# Patient Record
Sex: FEMALE | Race: WHITE | Employment: FULL TIME | URBAN - METROPOLITAN AREA
[De-identification: names, ages, dates, MRNs, and addresses within clinical notes are randomized per-mention and may not be internally consistent; named-entity substitution may affect disease eponyms.]

---

## 2017-04-10 ENCOUNTER — OFFICE VISIT (OUTPATIENT)
Dept: SURGERY | Facility: CLINIC | Age: 42
End: 2017-04-10
Payer: COMMERCIAL

## 2017-04-10 VITALS
BODY MASS INDEX: 19.99 KG/M2 | SYSTOLIC BLOOD PRESSURE: 137 MMHG | HEART RATE: 62 BPM | DIASTOLIC BLOOD PRESSURE: 86 MMHG | HEIGHT: 65 IN | WEIGHT: 120 LBS

## 2017-04-10 DIAGNOSIS — N60.19 FIBROCYSTIC BREAST CHANGES, UNSPECIFIED LATERALITY: Primary | ICD-10-CM

## 2017-04-10 PROCEDURE — 99213 OFFICE O/P EST LOW 20 MIN: CPT | Performed by: SURGERY

## 2017-04-10 NOTE — MR AVS SNAPSHOT
"              After Visit Summary   4/10/2017    Trini LOYA [facility]    MRN: [id number]           Patient Information     Date Of Birth          1975        Visit Information        Provider Department      4/10/2017 8:20 AM Kat Echevarria MD [facility]       Follow-ups after your visit        Who to contact     If you have questions or need follow up information about today's clinic visit or your schedule please contact [facility] directly at [phone].  Normal or non-critical lab and imaging results will be communicated to you by [employer]nethart, letter or phone within 4 business days after the clinic has received the results. If you do not hear from us within 7 days, please contact the clinic through [employer]t or phone. If you have a critical or abnormal lab result, we will notify you by phone as soon as possible.  Submit refill requests through SET or call your pharmacy and they will forward the refill request to us. Please allow 3 business days for your refill to be completed.          Additional Information About Your Visit        MyChart Information     SET gives you secure access to your electronic health record. If you see a primary care provider, you can also send messages to your care team and make appointments. If you have questions, please call your primary care clinic.  If you do not have a primary care provider, please call [phone] and they will assist you.        Care EveryWhere ID     This is your Care EveryWhere ID. This could be used by other organizations to access your [place] medical records  [id number]        Your Vitals Were     Pulse Height BMI (Body Mass Index)             62 5' 5\" (1.651 m) 19.97 kg/m2          Blood Pressure from Last 3 Encounters:   04/10/17 137/86   10/20/16 112/74   08/18/16 104/70    Weight from Last 3 Encounters:   04/10/17 120 lb (54.4 kg) "   08/18/16 122 lb 8 oz (55.6 kg)   03/28/16 120 lb (54.4 kg)              Today, you had the following     No orders found for display       Primary Care Provider Office Phone # Fax #    Christine Dyer [phone]       [facility] - [place] [address]  [facility] [address]        Thank you!     Thank you for choosing [facility]  for your care. Our goal is always to provide you with excellent care. Hearing back from our patients is one way we can continue to improve our services. Please take a few minutes to complete the written survey that you may receive in the mail after your visit with us. Thank you!             Your Updated Medication List - Protect others around you: Learn how to safely use, store and throw away your medicines at www.disposemymeds.org.          This list is accurate as of: 4/10/17  8:48 AM.  Always use your most recent med list.                   Brand Name Dispense Instructions for use    ALIGN 4 MG Caps      Take 1 tablet by mouth daily.       ketoconazole 2 % shampoo    NIZORAL    120 mL    Apply to the affected area and wash off after 5 minutes.       SUMAtriptan 25 MG tablet    IMITREX    9 tablet    Take 1-2 tablets (25-50 mg) by mouth at onset of headache for migraine May repeat dose in 2 hours.  Do not exceed 200 mg in 24 hours       VITAMIN B 12 PO          VITAMIN D (CHOLECALCIFEROL) PO      Take 1,000 Units by mouth daily

## 2017-04-10 NOTE — LETTER
"  April 10, 2017      [facility]  [birth year]    HISTORY OF PRESENT ILLNESS: Trini Schumacher is a 41 year old female who is seen in follow up regarding fibrocystic change.  I have seen Tirni several time previously for breast lumps that have been cysts. I last saw her in 2016. She has not noted new changes but does feel many cysts in both breasts. Her breasts are not that tender right now.     REVIEW OF SYSTEMS:  Constitutional: Negative for chills, fatigue, fever and weight change.  Eyes: Negative for blurred vision, eye pain and photophobia.  ENT: Negative for hearing problems, ENT pain, congestion, rhinorrhea, epistaxis, hoarseness and dental problems.  Cardiovascular: Negative for chest pain, palpitations, tachycardia, orthopnea and edema.  Respiratory: Negative for cough, dyspnea and hemoptysis.  Gastrointestinal: Negative for abdominal pain, heartburn, constipation, diarrhea and stool changes.  Musculoskeletal: Negative for arthralgias, back pain and myalgias.  Integumentary/Breast: See HPI.     Vitals: /86  Pulse 62  Ht 5' 5\" (1.651 m)  Wt 120 lb (54.4 kg)  BMI 19.97 kg/m2  BMI= Body mass index is 19.97 kg/(m^2).     EXAM:  GENERAL: healthy, alert and no distress   BREAST:  The breasts appear symmetric with no overlying skin changes. The nipples are normal bilaterally. There is no dimpling or thickening of the skin.  No mass is appreciated in either breast. The largest cyst is palpated at 5 o'clock in the left breast near the edge of the areola. Cysts were also palpated on the right at about 7 and 10 o'clock that are deeper in the tissue. The breast tissue is generally lumpy and fibrocystic with increased tenderness in areas.  There is no axillary or supraclavicular lymphadenopathy. A couple of soft, small lymph nodes are palpated in the left axilla.  CARDIOVASCULAR: No edema or JVD.  RESPIRATORY: negative findings: no chest deformities noted, no chest wall tenderness, " breathing is unlabored.  NECK: Neck supple. No adenopathy.   SKIN: No suspicious lesions or rashes  LYMPH: Normal cervical lymph nodes     ASSESSMENT:  Trini Schumacher has fibrocystic breasts with multiple cysts, but nothing on exam or imaging that is concerning for malignancy. We talked again about why she developed cysts at this point in her life and that it is likely related to hormonal changes. Hopefully it will resolve to some extent when she enters menopause. I encouraged Trini to continue with annual screening mammograms with tomosynthesis since the tomosynthesis adds some sensitivity that is useful in women with dense breast tissue and fibrocystic change.     PLAN:  Trini will continue with annual screening mammograms and clinical breast exams. She will follow up with me as needed.        Kat Echevarria MD

## 2017-04-10 NOTE — PROGRESS NOTES
CHIEF COMPLAINT:  Chief Complaint   Patient presents with     RECHECK     yearly follow up, breast cysts        HISTORY OF PRESENT ILLNESS:  Trini LOYA [facility] is a 41 year old female who is seen in follow up regarding fibrocystic change.  I have seen Trini several time previously for breast lumps that have been cysts.  I last saw her in October 2016.  She has not noted new changes but does feel many cysts in both breasts.  Her breasts are not that tender right now.    REVIEW OF SYSTEMS:  Constitutional:  Negative for chills, fatigue, fever and weight change.  Eyes:  Negative for blurred vision, eye pain and photophobia.  ENT:  Negative for hearing problems, ENT pain, congestion, rhinorrhea, epistaxis, hoarseness and dental problems.  Cardiovascular:  Negative for chest pain, palpitations, tachycardia, orthopnea and edema.  Respiratory:  Negative for cough, dyspnea and hemoptysis.  Gastrointestinal:  Negative for abdominal pain, heartburn, constipation, diarrhea and stool changes.  Musculoskeletal:  Negative for arthralgias, back pain and myalgias.  Integumentary/Breast:  See HPI.    Past Medical History:   Diagnosis Date     Breast lump 2009    small right 8 oclocl 11/2011, prior yo that 2009 at 3 and 9 occlock, feq cysts, s/b breast surgeon 10/2015      Comedonal acne 10/14/2016    Inflammatory and nodular cystic acne, seen by derm and given tretinoin 0.05 % cream bedtime     Dysplastic nevi 10/14/2016     Genital herpes 8/31/15    rare outbreaks 1 to 2 /yr, takes acyclovir 400 tid 5 days prn     H/O mammogram 9/1/15     History of motion sickness      Imbalance 8/2016    referred to neurology     Lactose intolerance      Migraine without status migrainosus, not intractable 8/22/2016     Multiple benign nevi 10/14/2016     Nystagmus 8/2016    referred to neurology     Pap smear, low-risk 8/31/15    normal hpv     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    Seen by derm  Given ketoconazole 2 % shampoo every other day  and ciclopirox 0.77 % topical gel to white bumps 1 to 2 months till rash gone       Past Surgical History:   Procedure Laterality Date     D & C      TAB     LAPAROSCOPIC DONOR HAND ASSISTED KIDNEY LIVING UNRELATED  9/13/2012    Procedure: LAPAROSCOPIC DONOR HAND ASSISTED KIDNEY LIVING UNRELATED;  LAPAROSCOPIC DONOR HAND ASSISTED LEFT KIDNEY LIVING NON DIRECT ;  Surgeon: Johnathon Villar MD;  Location: [facility]       Family History   Problem Relation Age of Onset     Arthritis Sister      RA     Thyroid Disease Mother      Breast Cancer No family hx of      Colon Cancer No family hx of      Prostate Cancer No family hx of      Other Cancer No family hx of      Emphysema Mother      Hypertension Father      Nephrolithiasis Father      OSTEOPOROSIS Maternal Grandmother      Skin Cancer Father      OSTEOPOROSIS Mother      CEREBROVASCULAR DISEASE Maternal Grandmother        Social History   Substance Use Topics     Smoking status: Never Smoker     Smokeless tobacco: Never Used     Alcohol use Yes      Comment: 1-2 drinks/week       Patient Active Problem List   Diagnosis     Hx of kidney donation     Vegetarian diet     Fatigue     Tremor     Migraine without status migrainosus, not intractable     Dysplastic nevi     Multiple benign nevi     Pityrosporum folliculitis     Comedonal acne     Allergies   Allergen Reactions     Dairy [Milk Products]      Current Outpatient Prescriptions   Medication Sig Dispense Refill     VITAMIN D, CHOLECALCIFEROL, PO Take 1,000 Units by mouth daily       Cyanocobalamin (VITAMIN B 12 PO)        Probiotic Product (ALIGN) 4 MG CAPS Take 1 tablet by mouth daily.       ketoconazole (NIZORAL) 2 % shampoo Apply to the affected area and wash off after 5 minutes. 120 mL 11     SUMAtriptan (IMITREX) 25 MG tablet Take 1-2 tablets (25-50 mg) by mouth at onset of headache for migraine May repeat dose in 2 hours.  Do not exceed 200 mg in 24 hours (Patient not taking: Reported on 4/10/2017) 9 tablet 0  "    Vitals: /86  Pulse 62  Ht 5' 5\" (1.651 m)  Wt 120 lb (54.4 kg)  BMI 19.97 kg/m2  BMI= Body mass index is 19.97 kg/(m^2).    EXAM:  GENERAL: healthy, alert and no distress   BREAST:  The breasts appear symmetric with no overlying skin changes.  The nipples are normal bilaterally.  There is no dimpling or thickening of the skin.  No mass is appreciated in either breast.  The largest cyst is palpated at 5 o'clock in the left breast near the edge of the areola.  Cysts were also palpated on the right at about 7 and 10 o'clock that are deeper in the tissue.  The breast tissue is generally lumpy and fibrocystic with increased tenderness in areas.  There is no axillary or supraclavicular lymphadenopathy.  A couple of soft, small lymph nodes are palpated in the left axilla.  CARDIOVASCULAR:  No edema or JVD.  RESPIRATORY: negative findings: no chest deformities noted, no chest wall tenderness, breathing is unlabored.  NECK: Neck supple. No adenopathy.   SKIN: No suspicious lesions or rashes  LYMPH: Normal cervical lymph nodes    ASSESSMENT:  [facility] with multiple cysts, but nothing on exam or imaging that is concerning for malignancy.  We talked again about why she developed cysts at this point in her life and that it is likely related to hormonal changes.  Hopefully it will resolve to some extent when she enters menopause.  I encouraged Trini to continue with annual screening mammograms with tomosynthesis since the tomosynthesis adds some sensitivity that is useful in women with dense breast tissue and fibrocystic change.    PLAN:  Trini will continue with annual screening mammograms and clinical breast exams.  She will follow up with me as needed.      Kat Echevarria MD    Please route or send letter to:  Primary Care Provider (PCP)    "

## 2017-07-08 ENCOUNTER — HEALTH MAINTENANCE LETTER (OUTPATIENT)
Age: 42
End: 2017-07-08

## 2017-11-20 ENCOUNTER — TELEPHONE (OUTPATIENT)
Dept: FAMILY MEDICINE | Facility: CLINIC | Age: 42
End: 2017-11-20

## 2017-11-20 NOTE — TELEPHONE ENCOUNTER
"Patient Communication Preferences indicate  Do not contact  and/or communication by \"Phone\" is not preferred. Call not required per Outreach team.        Outreach ,  Jorge L Thurman     "

## 2017-12-06 ENCOUNTER — OFFICE VISIT (OUTPATIENT)
Dept: OBGYN | Facility: CLINIC | Age: 42
End: 2017-12-06
Payer: COMMERCIAL

## 2017-12-06 VITALS
SYSTOLIC BLOOD PRESSURE: 100 MMHG | HEIGHT: 65 IN | WEIGHT: 122 LBS | DIASTOLIC BLOOD PRESSURE: 60 MMHG | BODY MASS INDEX: 20.33 KG/M2

## 2017-12-06 DIAGNOSIS — N63.0 LUMP OR MASS IN BREAST: ICD-10-CM

## 2017-12-06 DIAGNOSIS — Z01.411 ENCOUNTER FOR GYNECOLOGICAL EXAMINATION WITH ABNORMAL FINDING: Primary | ICD-10-CM

## 2017-12-06 PROCEDURE — 99203 OFFICE O/P NEW LOW 30 MIN: CPT | Performed by: OBSTETRICS & GYNECOLOGY

## 2017-12-06 NOTE — NURSING NOTE
"Chief Complaint   Patient presents with     Physical       Initial /60  Ht 5' 5\" (1.651 m)  Wt 122 lb (55.3 kg)  LMP 2017  Breastfeeding? No  BMI 20.3 kg/m2 Estimated body mass index is 20.3 kg/(m^2) as calculated from the following:    Height as of this encounter: 5' 5\" (1.651 m).    Weight as of this encounter: 122 lb (55.3 kg).  BP completed using cuff size: regular        The following HM Due: NONE      The following patient reported/Care Every where data was sent to:  P ABSTRACT QUALITY INITIATIVES [[id number]]  none      n/a              "

## 2017-12-06 NOTE — PROGRESS NOTES
"Trini LOYA [facility] is a 42 year old  who presents for breast and pelvic exam.  She was followed for a long time by Dr. Laura Potts (GYN at [facility]), and because of recurrent breast cysts was followed by Dr. Kat Echevarria (who has retired).  She seeks new GYN coverage as her insurance has changed.  Periods regular with some spotting prior to menses, had a reassuring ultrasound 2015, last pap normal 2015, plans 3 year repeat.  Was a kidney donor.      Past medical, surgical, family, and social history have been noted and updated in the [facility] data base.      ROS:  See above re /breast.  GI:  Has felt some abdominal distention.  Allergic to dairy.     OBJECTIVE: /60  Ht 5' 5\" (1.651 m)  Wt 122 lb (55.3 kg)  LMP 2017  Breastfeeding? No  BMI 20.3 kg/m2     Breasts examined, and a small cystic changes noted bilaterally, with a larger 1-2 cm cyst in the outer lower left breast.  The skin appears normal, without induration or thickening.      Pelvic exam:  External genitalia appeared normal without lesion.  Urethral meatus, urethra, bladder, perineum, and anus appeared normal. Cervix and vagina appeared normal, without lesion.  Bimanual exam revealed a normal sized non-tender uterus, with no adnexal masses.  Rectovaginal deferred.      ASSESSMENT: Breast lump, history of cyts.  Normal pelvic exam.      PLAN: Will plan mammogram, ultrasound as indicated.   Continue routine GYN exams or return prn.        "

## 2017-12-06 NOTE — Clinical Note
Sha Ruiz Is this coded correctly?  She wanted only a breast and pelvic exam.  I won't sign it till I hear back from you. Thanks.

## 2017-12-06 NOTE — MR AVS SNAPSHOT
After Visit Summary   12/6/2017    Trini OLYA [facility]    MRN: [id number]           Patient Information     Date Of Birth          1975        Visit Information        Provider Department      12/6/2017 3:00 PM Lalita Funk MD [facility]        Today's Diagnoses     Encounter for gynecological examination with abnormal finding    -  1    Lump or mass in breast           Follow-ups after your visit        Future tests that were ordered for you today     Open Future Orders        Priority Expected Expires Ordered    MA Diagnostic Digital Bilateral Routine  12/6/2018 12/6/2017            Who to contact     If you have questions or need follow up information about today's clinic visit or your schedule please contact [facility] directly at [phone].  Normal or non-critical lab and imaging results will be communicated to you by MyChart, letter or phone within 4 business days after the clinic has received the results. If you do not hear from us within 7 days, please contact the clinic through pushdhart or phone. If you have a critical or abnormal lab result, we will notify you by phone as soon as possible.  Submit refill requests through [employer] or call your pharmacy and they will forward the refill request to us. Please allow 3 business days for your refill to be completed.          Additional Information About Your Visit        MyChart Information     [employer] gives you secure access to your electronic health record. If you see a primary care provider, you can also send messages to your care team and make appointments. If you have questions, please call your primary care clinic.  If you do not have a primary care provider, please call [phone] and they will assist you.        Care EveryWhere ID     This is your Care EveryWhere ID. This could be used by other organizations to access your [place] medical records  [id number]        Your Vitals Were     Height Last  "Period Breastfeeding? BMI (Body Mass Index)          5' 5\" (1.651 m) 11/18/2017 No 20.3 kg/m2         Blood Pressure from Last 3 Encounters:   12/06/17 100/60   04/10/17 137/86   10/20/16 112/74    Weight from Last 3 Encounters:   12/06/17 122 lb (55.3 kg)   04/10/17 120 lb (54.4 kg)   08/18/16 122 lb 8 oz (55.6 kg)               Primary Care Provider Office Phone # Fax #    Christine Dyer [phone] [address]  [facility] [address]        Equal Access to Services     MELINA MACEDO : Hadkassandra Viera, wadavid patrick, rosaline janemajay costa, mark dacosta. So [facility] [phone].    ATENCIÓN: Si habla español, tiene a mishra disposición servicios gratuitos de asistencia lingüística. Llame al [phone].    We comply with applicable federal civil rights laws and [place] laws. We do not discriminate on the basis of race, color, national origin, age, disability, sex, sexual orientation, or gender identity.            Thank you!     Thank you for choosing [facility]  for your care. Our goal is always to provide you with excellent care. Hearing back from our patients is one way we can continue to improve our services. Please take a few minutes to complete the written survey that you may receive in the mail after your visit with us. Thank you!             Your Updated Medication List - Protect others around you: Learn how to safely use, store and throw away your medicines at www.disposemymeds.org.          This list is accurate as of: 12/6/17 11:59 PM.  Always use your most recent med list.                   Brand Name Dispense Instructions for use Diagnosis    ALIGN 4 MG Caps      Take 1 tablet by mouth daily.        ketoconazole 2 % shampoo    NIZORAL    120 mL    Apply to the affected area and wash off after 5 minutes.    Folliculitis       SUMAtriptan 25 MG tablet    IMITREX    9 tablet    Take 1-2 tablets (25-50 mg) by mouth at onset of " headache for migraine May repeat dose in 2 hours.  Do not exceed 200 mg in 24 hours    Migraine without status migrainosus, not intractable, unspecified migraine type, Hx of kidney donation       VITAMIN B 12 PO           VITAMIN D (CHOLECALCIFEROL) PO      Take 1,000 Units by mouth daily

## 2018-11-11 ENCOUNTER — HEALTH MAINTENANCE LETTER (OUTPATIENT)
Age: 43
End: 2018-11-11

## 2019-02-15 ENCOUNTER — TRANSFERRED RECORDS (OUTPATIENT)
Dept: HEALTH INFORMATION MANAGEMENT | Facility: CLINIC | Age: 44
End: 2019-02-15

## 2019-02-15 LAB — PAP SMEAR - HIM PATIENT REPORTED: NORMAL

## 2019-02-18 ENCOUNTER — TRANSFERRED RECORDS (OUTPATIENT)
Dept: HEALTH INFORMATION MANAGEMENT | Facility: CLINIC | Age: 44
End: 2019-02-18

## 2019-02-18 LAB
HPV ABSTRACT: NORMAL
PAP-ABSTRACT: NORMAL
TSH SERPL-ACNC: 2.77 UIU/ML (ref 0.45–4.5)

## 2019-03-12 ENCOUNTER — TRANSFERRED RECORDS (OUTPATIENT)
Dept: HEALTH INFORMATION MANAGEMENT | Facility: CLINIC | Age: 44
End: 2019-03-12

## 2019-03-22 ENCOUNTER — OFFICE VISIT (OUTPATIENT)
Dept: FAMILY MEDICINE | Facility: CLINIC | Age: 44
End: 2019-03-22
Payer: COMMERCIAL

## 2019-03-22 VITALS
OXYGEN SATURATION: 100 % | TEMPERATURE: 99 F | DIASTOLIC BLOOD PRESSURE: 74 MMHG | HEART RATE: 73 BPM | RESPIRATION RATE: 12 BRPM | BODY MASS INDEX: 20.83 KG/M2 | WEIGHT: 125 LBS | SYSTOLIC BLOOD PRESSURE: 114 MMHG | HEIGHT: 65 IN

## 2019-03-22 DIAGNOSIS — G25.0 BENIGN ESSENTIAL TREMOR: ICD-10-CM

## 2019-03-22 DIAGNOSIS — R20.2 PARESTHESIAS: Primary | ICD-10-CM

## 2019-03-22 DIAGNOSIS — G43.109 MIGRAINE WITH AURA AND WITHOUT STATUS MIGRAINOSUS, NOT INTRACTABLE: ICD-10-CM

## 2019-03-22 DIAGNOSIS — Z87.898 HISTORY OF FATIGUE: ICD-10-CM

## 2019-03-22 DIAGNOSIS — R13.10 DYSPHAGIA, UNSPECIFIED TYPE: ICD-10-CM

## 2019-03-22 DIAGNOSIS — R20.2 TINGLING IN EXTREMITIES: ICD-10-CM

## 2019-03-22 DIAGNOSIS — R41.3 MEMORY DIFFICULTY: ICD-10-CM

## 2019-03-22 DIAGNOSIS — R47.89 WORD FINDING DIFFICULTY: ICD-10-CM

## 2019-03-22 DIAGNOSIS — Z78.9 VEGETARIAN DIET: ICD-10-CM

## 2019-03-22 DIAGNOSIS — Z90.5 HX OF KIDNEY DONATION: ICD-10-CM

## 2019-03-22 LAB
ALBUMIN SERPL-MCNC: 4.3 G/DL (ref 3.4–5)
ALP SERPL-CCNC: 49 U/L (ref 40–150)
ALT SERPL W P-5'-P-CCNC: 20 U/L (ref 0–50)
ANION GAP SERPL CALCULATED.3IONS-SCNC: 8 MMOL/L (ref 3–14)
AST SERPL W P-5'-P-CCNC: 13 U/L (ref 0–45)
BASOPHILS # BLD AUTO: 0.1 10E9/L (ref 0–0.2)
BASOPHILS NFR BLD AUTO: 1.1 %
BILIRUB SERPL-MCNC: 0.5 MG/DL (ref 0.2–1.3)
BUN SERPL-MCNC: 14 MG/DL (ref 7–30)
CALCIUM SERPL-MCNC: 9.6 MG/DL (ref 8.5–10.1)
CHLORIDE SERPL-SCNC: 105 MMOL/L (ref 94–109)
CO2 SERPL-SCNC: 28 MMOL/L (ref 20–32)
CREAT SERPL-MCNC: 1.04 MG/DL (ref 0.52–1.04)
CRP SERPL-MCNC: <2.9 MG/L (ref 0–8)
DEPRECATED CALCIDIOL+CALCIFEROL SERPL-MC: 31 UG/L (ref 20–75)
DIFFERENTIAL METHOD BLD: NORMAL
ENA RNP IGG SER IA-ACNC: 0.2 AI (ref 0–0.9)
ENA SCL70 IGG SER IA-ACNC: <0.2 AI (ref 0–0.9)
ENA SM IGG SER-ACNC: <0.2 AI (ref 0–0.9)
ENA SS-A IGG SER IA-ACNC: <0.2 AI (ref 0–0.9)
ENA SS-B IGG SER IA-ACNC: <0.2 AI (ref 0–0.9)
EOSINOPHIL # BLD AUTO: 0.3 10E9/L (ref 0–0.7)
EOSINOPHIL NFR BLD AUTO: 4.9 %
ERYTHROCYTE [SEDIMENTATION RATE] IN BLOOD BY WESTERGREN METHOD: 7 MM/H (ref 0–20)
FERRITIN SERPL-MCNC: 11 NG/ML (ref 12–150)
GFR SERPL CREATININE-BSD FRML MDRD: 65 ML/MIN/{1.73_M2}
GLUCOSE SERPL-MCNC: 87 MG/DL (ref 70–99)
IRON SERPL-MCNC: 87 UG/DL (ref 35–180)
LYMPHOCYTES # BLD AUTO: 1.9 10E9/L (ref 0.8–5.3)
LYMPHOCYTES NFR BLD AUTO: 30.1 %
MONOCYTES # BLD AUTO: 0.5 10E9/L (ref 0–1.3)
MONOCYTES NFR BLD AUTO: 7.1 %
NEUTROPHILS # BLD AUTO: 3.6 10E9/L (ref 1.6–8.3)
NEUTROPHILS NFR BLD AUTO: 56.8 %
POTASSIUM SERPL-SCNC: 4.3 MMOL/L (ref 3.4–5.3)
PROT SERPL-MCNC: 7.5 G/DL (ref 6.8–8.8)
RHEUMATOID FACT SER NEPH-ACNC: <20 IU/ML (ref 0–20)
SODIUM SERPL-SCNC: 141 MMOL/L (ref 133–144)
VIT B12 SERPL-MCNC: 1089 PG/ML (ref 193–986)
WBC # BLD AUTO: 6.3 10E9/L (ref 4–11)

## 2019-03-22 PROCEDURE — 86039 ANTINUCLEAR ANTIBODIES (ANA): CPT | Performed by: FAMILY MEDICINE

## 2019-03-22 PROCEDURE — 86431 RHEUMATOID FACTOR QUANT: CPT | Performed by: FAMILY MEDICINE

## 2019-03-22 PROCEDURE — 36415 COLL VENOUS BLD VENIPUNCTURE: CPT | Performed by: FAMILY MEDICINE

## 2019-03-22 PROCEDURE — 86618 LYME DISEASE ANTIBODY: CPT | Performed by: FAMILY MEDICINE

## 2019-03-22 PROCEDURE — 85004 AUTOMATED DIFF WBC COUNT: CPT | Performed by: FAMILY MEDICINE

## 2019-03-22 PROCEDURE — 85048 AUTOMATED LEUKOCYTE COUNT: CPT | Performed by: FAMILY MEDICINE

## 2019-03-22 PROCEDURE — 86140 C-REACTIVE PROTEIN: CPT | Performed by: FAMILY MEDICINE

## 2019-03-22 PROCEDURE — 99214 OFFICE O/P EST MOD 30 MIN: CPT | Performed by: FAMILY MEDICINE

## 2019-03-22 PROCEDURE — 85652 RBC SED RATE AUTOMATED: CPT | Performed by: FAMILY MEDICINE

## 2019-03-22 PROCEDURE — 86038 ANTINUCLEAR ANTIBODIES: CPT | Performed by: FAMILY MEDICINE

## 2019-03-22 PROCEDURE — 83540 ASSAY OF IRON: CPT | Performed by: FAMILY MEDICINE

## 2019-03-22 PROCEDURE — 80053 COMPREHEN METABOLIC PANEL: CPT | Performed by: FAMILY MEDICINE

## 2019-03-22 PROCEDURE — 99207 C FOOT EXAM  NO CHARGE: CPT | Mod: 25 | Performed by: FAMILY MEDICINE

## 2019-03-22 PROCEDURE — 82607 VITAMIN B-12: CPT | Performed by: FAMILY MEDICINE

## 2019-03-22 PROCEDURE — 82306 VITAMIN D 25 HYDROXY: CPT | Performed by: FAMILY MEDICINE

## 2019-03-22 PROCEDURE — 86235 NUCLEAR ANTIGEN ANTIBODY: CPT | Performed by: FAMILY MEDICINE

## 2019-03-22 PROCEDURE — 82728 ASSAY OF FERRITIN: CPT | Performed by: FAMILY MEDICINE

## 2019-03-22 ASSESSMENT — MIFFLIN-ST. JEOR: SCORE: 1217.88

## 2019-03-22 NOTE — PROGRESS NOTES
SUBJECTIVE:   Trini Schumacher is a 43 year old female who presents to clinic today for the following health issues:    Feet and legs tingling     Onset: x 2 months     Description: feels like neuropathy   Location: feet and legs  Character: sensation     Intensity: moderate    Progression of Symptoms: worse    Accompanying Signs & Symptoms:  Other symptoms: numbness and tingling     History:   Previous similar pain: no       Precipitating factors:   Trauma or overuse: no     Alleviating factors:  Improved by: nothing    Therapies Tried and outcome: none     Vegetarian on B 12 & vit d, probiotics in the past, migraine with aura never used Imitrex prescribed prn in 2016, hx of left kidney lap donation in9/2012, with chronic fatigue in past and essential tremor with normal neuro exam in 2014, . Lactose intolerant and fibrocystic breasts followed by Gen surgeon last seen in march 2016 with normal annual screening mammograms, seen in 2014 for fatigue and tremor and at that time esr, CRP, iron, TSH, B12, micro albumin and creatinine were all normal. 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 CBC, ESR, RF, CCP, MACI was normal. Xray knee and left thumb were normal. Referred to ortho and PT but never seen. Dx with classic migraine with aura, no stroke like symptoms, exam benign, Relpax didn't do much for her but that was years ago, remote MRI negative. Given Imitrex  25 mg 1 to 2 tabs as needed for migraine onset , repeat in 2 hrs, no more then 200 mg in 24 hrs and no more then 9/ month, hesitant to try prophylactic med like propranolol or Topamax yet. Referred to Neurology  for concerns about memory, word finding, eye twitches and tremors and shakiness. But never seen. Seen by Gyn Dr Potts 10/2016 and acyclovir refilled, 10/2016 had left breast cyst and u/s showed cluster of simple cysts. [id number] seen again by breast specialist with normal exam, 10/2017 right breast cyst, seen by Dr Funk 12/2017 with normal pelvic exam. Seen at [facility] breast clinic for breast tenderness and fibrocystic disease.  [place] prescription monitoring negative  Here for tingling and buzzing like feeling in legs and feet    Here today for tingling. Noted first when sitting 2 months agio, had legs up resting on chair at work which is quite sedentary, its a weird leg electrical sensation in leg, thought initially maybe leg was asleep. Another time noted while in kitchen standing so not just with being sedentary. Not like typical pins and needles sensation. Was trying to pay attention. & notes no specific timing or related to activity or inactivity. Feels all below waist. Sister and she both have self dx of restless legs but this feeling is different from that. Fell while icy but these symptoms started before fall. Still recovering got a right leg bruise and a left wrist strain. Knee better.left wrist still hurts with some movements but no swelling, redness or warmth and has full non painful range of movement. Does yoga and pilates,not new.  Can feel it now while in the clinic. No abnormal movements of leg.  Does not feel related to anxiety or stress. Denies any mood disorder. Remains a Vegetarian. Is taking B 12. Noted trouble swallowing. Once night in bed had to tell self to swallow hard as felt pressure on throat. No food getting stuck or pain. No acid reflux , no phlegm in throat to clear up. Has had No  fever or chills, no headache or dizziness, no double or blurry vision, no facial pain, earache, sore throat, runny nose, post nasal drip, no trouble hearing, smelling, tasting,  no cough, no chest pain, trouble breathing or palpitations, No abdominal pain, heart burn, reflux, nausea or vomiting or diarrhea or constipation, no blood in stools or black stools, no weight loss or night sweats. No dysuria, hematuria, frequency, urgency, hesitancy, incontinence, No pelvic complaints. No leg swelling or joint pain. No back pain. No rash.  Still feeling memory concerns, looses self, goes up and forgets why she went upstairs, trouble word finding still  No significant migraines, never filled or used Imitrex, manages with self care. These leg symptoms independent of migraine aura which is more visual, feels not related to this  Went to [place] in  late feb ( born there and sister lives there) but knows these symptoms started before travel  LMP 3/10/19 , not pregnant    Notes had pap with HPV neg in 2/15/19 at [facility]  TSH then was 2.77, hb was 12.8, LDL was 105 and glucose was 81 so does not want those repeated. Cognizant of costs.     Problem list and histories reviewed & adjusted, as indicated.  Additional history: as documented    Patient Active Problem List   Diagnosis     Hx of kidney donation     Vegetarian diet     Fatigue     Benign essential tremor     Migraine without status migrainosus, not intractable     Dysplastic nevi     Multiple benign nevi     Pityrosporum folliculitis     Comedonal acne     Past Surgical History:   Procedure Laterality Date     D & C      TAB     LAPAROSCOPIC DONOR HAND ASSISTED KIDNEY LIVING UNRELATED  9/13/2012    Procedure: LAPAROSCOPIC DONOR HAND ASSISTED KIDNEY LIVING UNRELATED;  LAPAROSCOPIC DONOR HAND ASSISTED LEFT KIDNEY LIVING NON DIRECT ;  Surgeon: Johnathon Villar MD;  Location:  OR       Social History     Tobacco Use     Smoking status: Never Smoker     Smokeless tobacco:  Never Used   Substance Use Topics     Alcohol use: Yes     Comment: 1-2 drinks/week     Family History   Problem Relation Age of Onset     Thyroid Disease Mother      Emphysema Mother      Osteoporosis Mother      Hypertension Father      Nephrolithiasis Father      Skin Cancer Father      Arthritis Sister      Hernia Sister      Osteoporosis Maternal Grandmother      Cerebrovascular Disease Maternal Grandmother      Breast Cancer No family hx of      Colon Cancer No family hx of      Prostate Cancer No family hx of      Other Cancer No family hx of          Current Outpatient Medications   Medication Sig Dispense Refill     Cyanocobalamin (VITAMIN B 12 PO)        ketoconazole (NIZORAL) 2 % shampoo Apply to the affected area and wash off after 5 minutes. 120 mL 11     VITAMIN D, CHOLECALCIFEROL, PO Take 1,000 Units by mouth daily       Allergies   Allergen Reactions     Dairy [Milk Products]      Recent Labs   Lab Test 08/18/16  1805 08/31/15 09/25/14  0814 02/10/14  1106  08/31/12  0710 07/08/11  0648   LDL  --   --   --   --   --   --  101   HDL  --   --   --   --   --   --  60   TRIG  --   --   --   --   --   --  57   ALT 22  --   --  13  --  9 18   CR 1.07*  --  1.03 1.10*   < > 0.85 0.81   GFRESTIMATED 56*  --  59* 55*   < > 75 80   GFRESTBLACK 68  --  72 67   < > >90 >90   POTASSIUM 4.2  --   --  4.0   < > 5.0 4.3   TSH 2.26 2.240  --  1.93   < >  --   --     < > = values in this interval not displayed.      BP Readings from Last 3 Encounters:   03/22/19 114/74   12/06/17 100/60   04/10/17 137/86    Wt Readings from Last 3 Encounters:   03/22/19 56.7 kg (125 lb)   12/06/17 55.3 kg (122 lb)   04/10/17 54.4 kg (120 lb)                  Labs reviewed in EPIC    Reviewed and updated as needed this visit by clinical staff       Reviewed and updated as needed this visit by Provider         ROS:  Constitutional, HEENT, cardiovascular, pulmonary, GI, , musculoskeletal, neuro, skin, endocrine and psych systems are  "negative, except as otherwise noted.    OBJECTIVE:     /74 (BP Location: Left arm, Patient Position: Sitting, Cuff Size: Adult Regular)   Pulse 73   Temp 99  F (37.2  C) (Oral)   Resp 12   Ht 1.651 m (5' 5\")   Wt 56.7 kg (125 lb)   LMP 03/10/2019   SpO2 100%   BMI 20.80 kg/m    Body mass index is 20.8 kg/m .  GENERAL: healthy, alert and no distress  EYES: Eyes grossly normal to inspection, PERRL and conjunctivae and sclerae normal  HENT: ear canals and TM's normal, nose and mouth without ulcers or lesions  NECK: no adenopathy, no asymmetry, masses, or scars and thyroid normal to palpation  RESP: lungs clear to auscultation - no rales, rhonchi or wheezes  CV: regular rate and rhythm, normal S1 S2, no S3 or S4, no murmur, click or rub, no peripheral edema and peripheral pulses strong  ABDOMEN: soft, non tender, no hepatosplenomegaly, no masses and bowel sounds normal  MS: no gross musculoskeletal defects noted, no edema  SKIN: no suspicious lesions or rashes and tattoo - back , arms, etc  NEURO: Normal strength and tone, mentation intact and speech normal, DTR's B/l at knees normal, gait normal, no clonus, babinski's neg  PSYCH: mentation appears normal, affect normal/bright, anxious, judgement and insight intact and appearance well groomed    Diagnostic Test Results:  Results for orders placed or performed in visit on 03/22/19 (from the past 24 hour(s))   WBC with Diff   Result Value Ref Range    WBC 6.3 4.0 - 11.0 10e9/L    % Neutrophils 56.8 %    % Lymphocytes 30.1 %    % Monocytes 7.1 %    % Eosinophils 4.9 %    % Basophils 1.1 %    Absolute Neutrophil 3.6 1.6 - 8.3 10e9/L    Absolute Lymphocytes 1.9 0.8 - 5.3 10e9/L    Absolute Monocytes 0.5 0.0 - 1.3 10e9/L    Absolute Eosinophils 0.3 0.0 - 0.7 10e9/L    Absolute Basophils 0.1 0.0 - 0.2 10e9/L    Diff Method Automated Method        ASSESSMENT/PLAN:     1. Tingling of both feet/ Numbness and tingling of both legs more specifically a buzzing " sensation/paresthesia  Normal exam. Unclear etiology/diagnosis with uncertain prognosis requiring further workup below. Labs today. Did not repeat glucose,TSH or LDL as had recently and normal so HBA1c not done either. See neurology to further evaluate and treat. May need an EMG   Consider MRI lumbar/ brain if no answer. Will defer to neurology to decide. Migraine action plan given. Pap and TSH and hb and glucose normal with neg HPV in 2/2019. Continue care with breast specialist , gyn and dermatology. Left wrist better, monitor, suspect tendonitis following a fall. Follow up in 1 month for recheck from today.   - Comprehensive metabolic panel  - C FOOT EXAM  NO CHARGE  - Vitamin B12  - Vitamin D Deficiency  - Rheumatoid factor  - Iron  - Ferritin  - Lyme Disease Stacey with reflex to WB Serum  - Anti Nuclear Stacey IgG by IFA with Reflex  - BARB antibody panel  - ESR: Erythrocyte sedimentation rate  - CRP, inflammation  - NEUROLOGY ADULT REFERRAL    3. Dysphagia, unspecified type  None currently. Occurred once. See neuro and go from there  - NEUROLOGY ADULT REFERRAL    4. Memory difficulty  Many years, no change. See neurology  - NEUROLOGY ADULT REFERRAL    5. Word finding difficulty  Many years, no change. See neurology, consider MRI jessi will defer to neuro to order.  - NEUROLOGY ADULT REFERRAL    6. Migraine with aura and without status migrainosus, not intractable  Managed with self care, never tried imitrex, feels doing ok on no meds.  - Comprehensive metabolic panel  - Lyme Disease Stacey with reflex to WB Serum  - Anti Nuclear Stacey IgG by IFA with Reflex  - BARB antibody panel  - ESR: Erythrocyte sedimentation rate  - CRP, inflammation  - NEUROLOGY ADULT REFERRAL  - MIGRAINE ACTION PLAN    7. Benign essential tremor  Dx in 2014. None seen today. See neurology.   - Lyme Disease Stacey with reflex to WB Serum  - NEUROLOGY ADULT REFERRAL    8. Vegetarian diet  - Vitamin B12  - Vitamin D Deficiency  - Iron  - Ferritin    9.  Hx of kidney donation  - Comprehensive metabolic panel    10. History of fatigue  - Comprehensive metabolic panel  - Vitamin B12  - Vitamin D Deficiency    Christine Dyer MD  [facility]

## 2019-03-22 NOTE — PATIENT INSTRUCTIONS
Labs today  See neurology  May need an EMG   Consider MRI lumbar if no answer  Migraine action plan given  Pap and tsh and hb and glucose normal with neg hpv in 2/2019  Continue care with breast specialist , gyn and dermatology  Left wrist better, monitor, suspect tendonitis

## 2019-03-22 NOTE — RESULT ENCOUNTER NOTE
Tristian Ms. [facility],  Your results came back negative for rheumatoid arthritis. If you have any further concerns please do not hesitate to contact us by message, phone or making an appointment.  Have a good day   Dr Gomez ROB

## 2019-03-22 NOTE — RESULT ENCOUNTER NOTE
[facility],  Your results came back showing .   -Liver and gallbladder tests are normal (ALT,AST, Alk phos, bilirubin), kidney function is normal (Cr, GFR), sodium is normal, potassium is normal, calcium is normal, glucose is normal.  -Vitamin D level is normal and getting 1000 IU daily in your diet or supplements is recommended.   -actual iron is normal but ferritin which is iron stores are very low and can contribute sto carmping and retsless legs and maybe contributing to current symptoms. ADVISE: increasing iron in your diet and consider taking iron supplement for 2 months (ferrous gluconate 325 mg twice daily or alternate equivalent vegetarian option  - to avoid constipation from the supplement you should increase fluid intake and fiber in your diet)  Also, recheck your labs in 2 months. If you have any further concerns please do not hesitate to contact us by message, phone or making an appointment.  Have a good day   Dr Gomez ROB

## 2019-03-22 NOTE — RESULT ENCOUNTER NOTE
Tristian Ms. [facility],  So far white count and sed rate are normal. If you have any further concerns please do not hesitate to contact us by message, phone or making an appointment.  Have a good day   Dr Gomez ROB

## 2019-03-22 NOTE — RESULT ENCOUNTER NOTE
[facility],  Your results came back with more than adequate B 12 and normal CRP ( inflammation marker) If you have any further concerns please do not hesitate to contact us by message, phone or making an appointment.  Have a good day   Dr Gomez ROB

## 2019-03-22 NOTE — LETTER
My Migraine Action Plan      Date: 3/22/2019     My Name: Trini LOYA [facility]   YOB: 1975  My Pharmacy:    [id number] - [facility] [address] AT [facility] [address]  [id number] - [place] - [address]  [id number] - [place], [place] - [address] AT [facility] [address]       My (Preventative) Control Medicine: none        My Rescue Medicine: over the counter   My Doctor: Christine Dyer     My Clinic: [address] [address] [phone]        GREEN ZONE = Good Control    My headache plan is working.   I can do what I need to do.           I WILL:     ? Keep managing my triggers.  ? Write in my migraine diary each time I have a headache.  ? Keep taking my preventive (controller) medicine daily.  ? Take my relief and rescue medicine as needed.             YELLOW ZONE = Not Enough Control    My headache plan isn t always working.   My headaches keep me from doing   some of the things I need to do.       I WILL:     ? Set goals to control my triggers and act on them.  ? Write in my migraine diary each time I have a headache and review it for                      patterns or new triggers.  ? Keep taking my preventive (controller) medicine daily.  ? Take my relief and rescue medicine as needed.  ? Call my doctor or clinic at if I stay in the Yellow Zone.             RED ZONE = Poor or No Control    My headache plan has  failed. I can t do anything  when I have one. My  medicines aren t working.           I WILL:   ? Set goals to control my triggers and act on them.  ? Write in my migraine diary each time I have a headache and review it for                      patterns or new triggers.  ? Keep taking my preventive (controller) medicine daily.  ? Take my relief and rescue medicine as needed.  ? Call my doctor or clinic or go to urgent care or an ER if I m having the worst                   headache of my life.  ? Call my doctor or clinic or go to urgent care or an ER if my medicine doesn t work.  ? Let my doctor or clinic know within 2 weeks if I have gone to an urgent care or             emergency department.          Provider specific instructions:

## 2019-03-22 NOTE — RESULT ENCOUNTER NOTE
[facility],  Your results came back so far with a normal antinuclear workup. If you have any further concerns please do not hesitate to contact us by message, phone or making an appointment.  Have a good day   Dr Gomez ROB

## 2019-03-22 NOTE — Clinical Note
pap with HPV neg in 2/15/19 at [facility] then was 2.77, hb was 12.8, ldl was 105 and glucose was 81

## 2019-03-25 ENCOUNTER — MYC MEDICAL ADVICE (OUTPATIENT)
Dept: FAMILY MEDICINE | Facility: CLINIC | Age: 44
End: 2019-03-25

## 2019-03-25 ENCOUNTER — TELEPHONE (OUTPATIENT)
Dept: FAMILY MEDICINE | Facility: CLINIC | Age: 44
End: 2019-03-25

## 2019-03-25 DIAGNOSIS — R20.0 NUMBNESS AND TINGLING OF BOTH LOWER EXTREMITIES: ICD-10-CM

## 2019-03-25 DIAGNOSIS — R76.8 ANA POSITIVE: Primary | ICD-10-CM

## 2019-03-25 DIAGNOSIS — R20.2 NUMBNESS AND TINGLING OF BOTH LOWER EXTREMITIES: ICD-10-CM

## 2019-03-25 LAB
ANA PAT SER IF-IMP: ABNORMAL
ANA SER QL IF: ABNORMAL
ANA TITR SER IF: ABNORMAL {TITER}
B BURGDOR IGG+IGM SER QL: 0.03 (ref 0–0.89)

## 2019-03-25 NOTE — RESULT ENCOUNTER NOTE
Tristian Ms. [facility],  Your results came back showing jessica borderline positive. This can be non specific and may not mean anything but given symptoms and this finding may be worth seeing rheumatology. I will put the referral in and send you the number to call in a separate message. If you have any further concerns please do not hesitate to contact us by message, phone or making an appointment.  Have a good day   Dr Gomez ROB

## 2019-03-25 NOTE — RESULT ENCOUNTER NOTE
Tristian Ms. [facility],  Your results came back negative for lymes. If you have any further concerns please do not hesitate to contact us by message, phone or making an appointment.  Have a good day   Dr Gomez ROB

## 2019-03-25 NOTE — TELEPHONE ENCOUNTER
Please send rheum referral infor to her thanks    Your provider has referred you to: [facility]  [facility]  [phone] [email or web address]    [facility]:  [facility]   [phone] [email or web address]  [facility]: [facility] [phone]   [email or web address]

## 2019-04-01 ENCOUNTER — DOCUMENTATION ONLY (OUTPATIENT)
Dept: FAMILY MEDICINE | Facility: CLINIC | Age: 44
End: 2019-04-01

## 2019-04-01 NOTE — PROGRESS NOTES
Notes received form [facility] 2008 to 2019 about 49 pages and updated epic and sent to be scanned.  PAP nil neg HPV 9/1/2015  And PAP nil neg HPV 2/18/19  one probable fibroin in u/s done for irregular cycle in 2015

## 2019-04-03 ENCOUNTER — OFFICE VISIT (OUTPATIENT)
Dept: NEUROLOGY | Facility: CLINIC | Age: 44
End: 2019-04-03
Payer: COMMERCIAL

## 2019-04-03 VITALS
DIASTOLIC BLOOD PRESSURE: 72 MMHG | TEMPERATURE: 98.2 F | HEART RATE: 86 BPM | WEIGHT: 125 LBS | SYSTOLIC BLOOD PRESSURE: 128 MMHG | BODY MASS INDEX: 20.8 KG/M2 | OXYGEN SATURATION: 99 % | RESPIRATION RATE: 16 BRPM

## 2019-04-03 DIAGNOSIS — R20.2 BILATERAL LEG PARESTHESIA: Primary | ICD-10-CM

## 2019-04-03 PROCEDURE — 99205 OFFICE O/P NEW HI 60 MIN: CPT | Performed by: PSYCHIATRY & NEUROLOGY

## 2019-04-03 NOTE — PATIENT INSTRUCTIONS
AFTER VISIT SUMMARY (AVS):    At today's visit we thoroughly discussed various diagnostic possibilities for your symptoms, possible future evaluation (EMG), and the plan.  However, at the present time, no additional neurological testing is needed.  Please return to your primary care provider to continue your workup.  Please come back if your symptoms worsen.    No new medications.    Next follow-up appointment is on as-needed basis.    Please do not hesitate to call me with any questions or concerns.    Thanks.

## 2019-04-03 NOTE — LETTER
"    4/3/2019         RE: Trini Schumacher  [address]  [facility] [address]        Dear Colleague,    Thank you for referring your patient, Trini Schumacher, to the [facility]. Please see a copy of my visit note below.    INITIAL NEUROLOGY CONSULTATION    DATE OF VISIT: 4/3/2019  CLINIC LOCATION: [facility]  MRN: [id number]  PATIENT NAME: Trini Schumacher  YOB: 1975    PRIMARY CARE PROVIDER: Christine Dyer MD     REASON FOR VISIT:   Chief Complaint   Patient presents with     NEUROPATHY     tingling/numbness in both feet. Face-lower lip-buzzing and have a hard time talking      HISTORY OF PRESENT ILLNESS:                                                    Ms. Trini Schumacher is 43 year old right handed vegetarian female patient with past medical history of migraine, essential tremor, and multiple benign nevi, who was seen in consultation today requested by Christine Dyer MD, for lower extremity paresthesia.    Per patient's report, she was in her usual state of health until approximately 1 month ago, when she developed intermittent \"electrical buzzing/internal electrical current\" sensation in her both lower extremities from knees down that happens randomly when she is standing, sitting, or lying down.  It is present about 80% of the time.  In addition, for the last few days she has similar sensation in her lower lip that affects her ability to speak at times as well as throat discomfort.  She denies any additional associated complaints, including focal neurological symptoms.  No aggravating or alleviating factors.  Started on iron due to low ferritin levels on the advice of her primary care provider.  No other treatments tried.    Most recent laboratory evaluation from March 2019 includes unremarkable CMP, low ferritin (11), normal rheumatoid factor, slightly elevated vitamin B12 (1089), normal vitamin D level (31), negative Lyme serology, normal sed " "rate, unremarkable CBC, negative RNP, scleroderma, anti-Rogers, SSA/SSB antibodies, and borderline positive MACI antibody.  Last TSH (2.26) was normal in 2016.    No prior brain or spine imaging is available for review.  Reportedly, the patient had \"brain scan\" more than 10 years ago and was told that it was normal.  No additional useful information is available in Care Everywhere, which was reviewed.    Review of Systems - the patient endorses insomnia, anxiety, depression, panic attacks, headaches, difficulty swallowing, tinnitus, and easy skin bruising.  All of them have been previously discussed with other medical providers.  Otherwise, she denies any other complaints on 14-point comprehensive review of systems.  PAST MEDICAL/SURGICAL HISTORY:                                                    I personally reviewed patient's past medical and surgical history with the patient at today's visit.  Patient Active Problem List   Diagnosis     Hx of kidney donation     Vegetarian diet     Fatigue     Benign essential tremor     Migraine without status migrainosus, not intractable     Dysplastic nevi     Multiple benign nevi     Pityrosporum folliculitis     Comedonal acne     Past Medical History:   Diagnosis Date     Benign essential tremor 2/10/2014    -Normal B12, iron, vit D, TSH.  Likely essential tremor.      Breast lump 2009    small right 8 oclocl 11/2011, prior yo that 2009 at 3 and 9 occlock, feq cysts, s/b breast surgeon 10/2015      Comedonal acne 10/14/2016    Inflammatory and nodular cystic acne, seen by derm and given tretinoin 0.05 % cream bedtime     Dysplastic nevi 10/14/2016     Fibroid 09/2015    one probable fibroin in u/s done for irregular cycle in 2015     Genital herpes 8/31/15    rare outbreaks 1 to 2 /yr, takes acyclovir 400 tid 5 days prn     H/O mammogram 9/1/15     History of motion sickness      Imbalance 8/2016    referred to neurology     Lactose intolerance      Migraine without status " migrainosus, not intractable 8/22/2016     Multiple benign nevi 10/14/2016     Nystagmus 8/2016    referred to neurology     Pap smear, low-risk 8/31/15    normal hpv     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    Seen by derm  Given ketoconazole 2 % shampoo every other day and ciclopirox 0.77 % topical gel to white bumps 1 to 2 months till rash gone     Past Surgical History:   Procedure Laterality Date     D & C      TAB     LAPAROSCOPIC DONOR HAND ASSISTED KIDNEY LIVING UNRELATED  9/13/2012    Procedure: LAPAROSCOPIC DONOR HAND ASSISTED KIDNEY LIVING UNRELATED;  LAPAROSCOPIC DONOR HAND ASSISTED LEFT KIDNEY LIVING NON DIRECT ;  Surgeon: Johnathon Villar MD;  Location: [facility] OR     MEDICATIONS:                                                    I personally reviewed patient's medications and allergies with the patient at today's visit.  Current Outpatient Medications on File Prior to Visit:  IRON-B12-VITAMINS PO    ketoconazole (NIZORAL) 2 % shampoo Apply to the affected area and wash off after 5 minutes.   VITAMIN D, CHOLECALCIFEROL, PO Take 1,000 Units by mouth daily     ALLERGIES:                                                      Allergies   Allergen Reactions     Dairy [Milk Products]      FAMILY/SOCIAL HISTORY:                                                    Family and social history was reviewed with the patient at today's visit.  Family history is positive for stroke and migraine.  Never smoker.  1-2 alcohol drinks per week.  Denies recreational drug use.  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  REVIEW OF SYSTEMS:                                                    Patient has completed a [facility] Patient Health History, including a 14-system review, which was personally reviewed, and pertinent positives are listed in HPI. She denies any additional problems on the further questioning.  EXAM:                                                    VITAL SIGNS:    /72 (BP Location: Left arm, Patient Position: Sitting, Cuff Size: Adult Regular)   Pulse 86   Temp 98.2  F (36.8  C) (Oral)   Resp 16   Wt 56.7 kg (125 lb)   LMP 03/10/2019   SpO2 99%   BMI 20.80 kg/m     Mini-Cog Assessment:  Mini Cog Assessment  Clock Draw Score: 2 Normal  3 Item Recall: 3 objects recalled  Mini Cog Total Score: 5  Administered by: : Piedad Hussein MA    General: pt is in NAD, cooperative.  Skin: normal turgor, moist mucous membranes, no lesions/rashes noticed.  HEENT: ATNC, EOMI, PERRL, white sclera, normal conjunctiva, no nystagmus or ptosis. No carotid bruits bilaterally.  Respiratory: lung sounds clear to auscultation bilaterally, no crackles, wheezes, rhonchi. Symmetric lung excursion, no accessory respiratory muscle use.  Cardiovascular: normal S1/S2, no murmurs/rubs/gallops.   Abdomen: Not distended.  : deferred.    Neurological:  Mental: alert, follows commands, Mini Cog Total Score: 5/5 with 3/3 on memory recall, no aphasia or dysarthria. Fund of knowledge is appropriate for age.  Cranial Nerves:  CN II: visual acuity - able to accurately count fingers with each eye. Visual fields intact, fundi: discs sharp, no papilledema and normal vessels bilaterally.  CN III, IV, VI: EOM intact, pupils equal and reactive  CN V: facial sensation nl  CN VII: face symmetric, no facial droop  CN VIII: hearing normal  CN IX: palate elevation symmetric, uvula at midline  CN XI SCM normal, shoulder shrug nl  CN XII: tongue midline  Motor: Strength: 5/5 in all major groups of all extremities. Normal tone. No abnormal movements. No pronator drift b/l.  Reflexes: Triceps, biceps, brachioradialis, patellar, and achilles reflexes normal and symmetric. No clonus noted. Toes are down-going b/l.   Sensory: temperature, light touch, pinprick, and vibration intact. Romberg: negative.  Coordination: FNF and heel-shin tests intact b/l.  Gait:  Normal, able to tandem, toe, and heel walk.  DATA:    LABS/IMAGING/OTHER STUDIES: I reviewed pertinent medical records, including Care Everywhere, as detailed in the history of present illness.  ASSESSMENT AND PLAN:      ASSESSMENT: Trini Schumacher is a 43 year old vegetarian female patient with past medical history of migraine, essential tremor, and multiple benign nevi, who presents with bilateral feet paresthesias started 1 month ago.    We had a prolonged discussion with the patient regarding her symptoms.  Her neurological exam today is non-focal.  Recent laboratory workup unrevealing, asides from low ferritin level and borderline positive MACI (both are non-specific).    I reviewed with the patient that her current symptoms are not consistent with, neurological conditions, such as multiple sclerosis, peripheral polyneuropathy, lumbar radiculopathy, or focal entrapment neuropathies.  They might be caused by different medical conditions or elevated anxiety, though later would be the diagnosis of exclusion.  We discussed the utility of obtaining EMG, but at the present time I do not see any indications for it.  We might consider it in the future, so as MRI of the brain or spine if she develops focal neurological symptoms.    DIAGNOSES:    ICD-10-CM    1. Bilateral leg paresthesia R20.2 EMG     PLAN: At today's visit we thoroughly discussed various diagnostic possibilities for patient's symptoms, possible future evaluation (EMG), and the plan.  However, at the present time, no additional neurological testing is needed.  I advised the patient to return to her primary care provider to continue her workup.  I also recommended to come back if her symptoms worsen or she develops new focal neurological complaints.    No new medications.    Next follow-up appointment is on as-needed basis.    Total Time:  82 minutes with > 50% spent counseling the patient on stated above assessment and recommendations, including nature of the diagnosis, possible future w/u, and proposed  plan.  Additional time was used to answer questions regarding patient's symptoms, my recommendations, and the plan.    Kevin Etienne MD  / Neurology  [facility]  (Chart documentation was completed in part with Dragon voice-recognition software. Even though reviewed, some grammatical, spelling, and word errors may remain.)            Again, thank you for allowing me to participate in the care of your patient.        Sincerely,        Kevin Etienne MD

## 2019-04-03 NOTE — PROGRESS NOTES
"INITIAL NEUROLOGY CONSULTATION    DATE OF VISIT: 4/3/2019  CLINIC LOCATION: [facility]  MRN: [id number]  PATIENT NAME: Trini Schumacher  YOB: 1975    PRIMARY CARE PROVIDER: Christine Dyer MD     REASON FOR VISIT:   Chief Complaint   Patient presents with     NEUROPATHY     tingling/numbness in both feet. Face-lower lip-buzzing and have a hard time talking      HISTORY OF PRESENT ILLNESS:                                                    [facility] 43 [facility] right handed vegetarian female patient with past medical history of migraine, essential tremor, and multiple benign nevi, who was seen in consultation today requested by Christine Dyer MD, for lower extremity paresthesia.    Per patient's report, she was in her usual state of health until approximately 1 month ago, when she developed intermittent \"electrical buzzing/internal electrical current\" sensation in her both lower extremities from knees down that happens randomly when she is standing, sitting, or lying down.  It is present about 80% of the time.  In addition, for the last few days she has similar sensation in her lower lip that affects her ability to speak at times as well as throat discomfort.  She denies any additional associated complaints, including focal neurological symptoms.  No aggravating or alleviating factors.  Started on iron due to low ferritin levels on the advice of her primary care provider.  No other treatments tried.    Most recent laboratory evaluation from March 2019 includes unremarkable CMP, low ferritin (11), normal rheumatoid factor, slightly elevated vitamin B12 (1089), normal vitamin D level (31), negative Lyme serology, normal sed rate, unremarkable CBC, negative RNP, scleroderma, anti-Rogers, SSA/SSB antibodies, and borderline positive MACI antibody.  Last TSH (2.26) was normal in 2016.    No prior brain or spine imaging is available for review.  Reportedly, the patient had \"brain " "scan\" more than 10 years ago and was told that it was normal.  No additional useful information is available in Care Everywhere, which was reviewed.    Review of Systems - the patient endorses insomnia, anxiety, depression, panic attacks, headaches, difficulty swallowing, tinnitus, and easy skin bruising.  All of them have been previously discussed with other medical providers.  Otherwise, she denies any other complaints on 14-point comprehensive review of systems.  PAST MEDICAL/SURGICAL HISTORY:                                                    I personally reviewed patient's past medical and surgical history with the patient at today's visit.  Patient Active Problem List   Diagnosis     Hx of kidney donation     Vegetarian diet     Fatigue     Benign essential tremor     Migraine without status migrainosus, not intractable     Dysplastic nevi     Multiple benign nevi     Pityrosporum folliculitis     Comedonal acne     Past Medical History:   Diagnosis Date     Benign essential tremor 2/10/2014    -Normal B12, iron, vit D, TSH.  Likely essential tremor.      Breast lump 2009    small right 8 oclocl 11/2011, prior yo that 2009 at 3 and 9 occlock, feq cysts, s/b breast surgeon 10/2015      Comedonal acne 10/14/2016    Inflammatory and nodular cystic acne, seen by derm and given tretinoin 0.05 % cream bedtime     Dysplastic nevi 10/14/2016     Fibroid 09/2015    one probable fibroin in u/s done for irregular cycle in 2015     Genital herpes 8/31/15    rare outbreaks 1 to 2 /yr, takes acyclovir 400 tid 5 days prn     H/O mammogram 9/1/15     History of motion sickness      Imbalance 8/2016    referred to neurology     Lactose intolerance      Migraine without status migrainosus, not intractable 8/22/2016     Multiple benign nevi 10/14/2016     Nystagmus 8/2016    referred to neurology     Pap smear, low-risk 8/31/15    normal hpv     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    Seen by derm  Given ketoconazole 2 % " shampoo every other day and ciclopirox 0.77 % topical gel to white bumps 1 to 2 months till rash gone     Past Surgical History:   Procedure Laterality Date     D & C      TAB     LAPAROSCOPIC DONOR HAND ASSISTED KIDNEY LIVING UNRELATED  9/13/2012    Procedure: LAPAROSCOPIC DONOR HAND ASSISTED KIDNEY LIVING UNRELATED;  LAPAROSCOPIC DONOR HAND ASSISTED LEFT KIDNEY LIVING NON DIRECT ;  Surgeon: Johnathon Villar MD;  Location: [facility] OR     MEDICATIONS:                                                    I personally reviewed patient's medications and allergies with the patient at today's visit.  Current Outpatient Medications on File Prior to Visit:  IRON-B12-VITAMINS PO    ketoconazole (NIZORAL) 2 % shampoo Apply to the affected area and wash off after 5 minutes.   VITAMIN D, CHOLECALCIFEROL, PO Take 1,000 Units by mouth daily     ALLERGIES:                                                      Allergies   Allergen Reactions     Dairy [Milk Products]      FAMILY/SOCIAL HISTORY:                                                    Family and social history was reviewed with the patient at today's visit.  Family history is positive for stroke and migraine.  Never smoker.  1-2 alcohol drinks per week.  Denies recreational drug use.  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  REVIEW OF SYSTEMS:                                                    Patient has completed a [facility] Patient Health History, including a 14-system review, which was personally reviewed, and pertinent positives are listed in HPI. She denies any additional problems on the further questioning.  EXAM:                                                    VITAL SIGNS:   /72 (BP Location: Left arm, Patient Position: Sitting, Cuff Size: Adult Regular)   Pulse 86   Temp 98.2  F (36.8  C) (Oral)   Resp 16   Wt 56.7 kg (125 lb)   LMP 03/10/2019   SpO2 99%   BMI 20.80 kg/m    Mini-Cog Assessment:  Mini  Cog Assessment  Clock Draw Score: 2 Normal  3 Item Recall: 3 objects recalled  Mini Cog Total Score: 5  Administered by: : Piedad Hussein MA    General: pt is in NAD, cooperative.  Skin: normal turgor, moist mucous membranes, no lesions/rashes noticed.  HEENT: ATNC, EOMI, PERRL, white sclera, normal conjunctiva, no nystagmus or ptosis. No carotid bruits bilaterally.  Respiratory: lung sounds clear to auscultation bilaterally, no crackles, wheezes, rhonchi. Symmetric lung excursion, no accessory respiratory muscle use.  Cardiovascular: normal S1/S2, no murmurs/rubs/gallops.   Abdomen: Not distended.  : deferred.    Neurological:  Mental: alert, follows commands, Mini Cog Total Score: 5/5 with 3/3 on memory recall, no aphasia or dysarthria. Fund of knowledge is appropriate for age.  Cranial Nerves:  CN II: visual acuity - able to accurately count fingers with each eye. Visual fields intact, fundi: discs sharp, no papilledema and normal vessels bilaterally.  CN III, IV, VI: EOM intact, pupils equal and reactive  CN V: facial sensation nl  CN VII: face symmetric, no facial droop  CN VIII: hearing normal  CN IX: palate elevation symmetric, uvula at midline  CN XI SCM normal, shoulder shrug nl  CN XII: tongue midline  Motor: Strength: 5/5 in all major groups of all extremities. Normal tone. No abnormal movements. No pronator drift b/l.  Reflexes: Triceps, biceps, brachioradialis, patellar, and achilles reflexes normal and symmetric. No clonus noted. Toes are down-going b/l.   Sensory: temperature, light touch, pinprick, and vibration intact. Romberg: negative.  Coordination: FNF and heel-shin tests intact b/l.  Gait:  Normal, able to tandem, toe, and heel walk.  DATA:   LABS/IMAGING/OTHER STUDIES: I reviewed pertinent medical records, including Care Everywhere, as detailed in the history of present illness.  ASSESSMENT AND PLAN:      ASSESSMENT: Trini Salmonoverruba is a 43 year old vegetarian female patient with past medical  history of migraine, essential tremor, and multiple benign nevi, who presents with bilateral feet paresthesias started 1 month ago.    We had a prolonged discussion with the patient regarding her symptoms.  Her neurological exam today is non-focal.  Recent laboratory workup unrevealing, asides from low ferritin level and borderline positive MACI (both are non-specific).    I reviewed with the patient that her current symptoms are not consistent with, neurological conditions, such as multiple sclerosis, peripheral polyneuropathy, lumbar radiculopathy, or focal entrapment neuropathies.  They might be caused by different medical conditions or elevated anxiety, though later would be the diagnosis of exclusion.  We discussed the utility of obtaining EMG, but at the present time I do not see any indications for it.  We might consider it in the future, so as MRI of the brain or spine if she develops focal neurological symptoms.    DIAGNOSES:    ICD-10-CM    1. Bilateral leg paresthesia R20.2 EMG     PLAN: At today's visit we thoroughly discussed various diagnostic possibilities for patient's symptoms, possible future evaluation (EMG), and the plan.  However, at the present time, no additional neurological testing is needed.  I advised the patient to return to her primary care provider to continue her workup.  I also recommended to come back if her symptoms worsen or she develops new focal neurological complaints.    No new medications.    Next follow-up appointment is on as-needed basis.    Total Time:  82 minutes with > 50% spent counseling the patient on stated above assessment and recommendations, including nature of the diagnosis, possible future w/u, and proposed plan.  Additional time was used to answer questions regarding patient's symptoms, my recommendations, and the plan.    Kevin Etienne MD  / Neurology  [place]  (Chart documentation was completed in part with Dragon voice-recognition software.  Even though reviewed, some grammatical, spelling, and word errors may remain.)

## 2019-04-22 ENCOUNTER — TELEPHONE (OUTPATIENT)
Dept: RHEUMATOLOGY | Facility: CLINIC | Age: 44
End: 2019-04-22

## 2019-04-22 NOTE — TELEPHONE ENCOUNTER
[facility] Call Center    Phone Message    May a detailed message be left on voicemail: yes    Reason for Call: Other: This is a new pt being referred for pos jessica. Please contact for intake. Thanks.     Action Taken: Message routed to:  [facility] ([facility]

## 2019-05-07 NOTE — TELEPHONE ENCOUNTER
Call was placed to patient regarding the referral without success.  Message was left asking patient to call the clinic to discuss the referral. Awaiting a call back from the patient.  Kendra Ratliff CMA  5/7/2019 2:37 PM

## 2019-05-07 NOTE — TELEPHONE ENCOUNTER
[facility] Call Center    Phone Message    May a detailed message be left on voicemail: yes    Reason for Call: Other: Pt returned Kendra's call, please call her back as soon as possible     Action Taken: Message routed to:  [facility] see above

## 2019-05-16 NOTE — TELEPHONE ENCOUNTER
General Rheumatology Intake Form    Reason for referral: positive MACI, numbness in lower extremities  Referring provider: Christine Dyer MD    Who is your primary care provider/clinic? Dr. Dyer    Past Rheumatologist: No     Have you ever been diagnosed with fibromyalgia/generalized pain syndrome/chronic pain/chronic fatigue? No.     Who manages your care for this issue now? None     What is your most urgent concern at this time? None at this time    Have you seen any specialist related to the reason you are coming here? Neurology who stated that they did not feel that there was anything neurology related going on    Where are we expecting records (labs, imaging or pathology) from? In Epic    Offered an appointment on 7/5/19 at 7:30am with Dr. Villarreal, patient accepted and was instructed to arrive 15 minutes prior to appointment and asked to bring a current medication list. Patient verbalized understanding. Task sent to Clinic Coordinators to schedule appointment.       Kendra Ratliff CMA  5/16/2019 3:41 PM

## 2019-07-05 ENCOUNTER — OFFICE VISIT (OUTPATIENT)
Dept: RHEUMATOLOGY | Facility: CLINIC | Age: 44
End: 2019-07-05
Attending: INTERNAL MEDICINE
Payer: COMMERCIAL

## 2019-07-05 VITALS
HEART RATE: 84 BPM | WEIGHT: 123.4 LBS | RESPIRATION RATE: 18 BRPM | DIASTOLIC BLOOD PRESSURE: 74 MMHG | HEIGHT: 65 IN | TEMPERATURE: 98.4 F | SYSTOLIC BLOOD PRESSURE: 108 MMHG | BODY MASS INDEX: 20.56 KG/M2

## 2019-07-05 DIAGNOSIS — R20.0 NUMBNESS AND TINGLING OF BOTH LOWER EXTREMITIES: ICD-10-CM

## 2019-07-05 DIAGNOSIS — R76.8 ANA POSITIVE: ICD-10-CM

## 2019-07-05 DIAGNOSIS — M25.569 ARTHRALGIA OF KNEE, UNSPECIFIED LATERALITY: Primary | ICD-10-CM

## 2019-07-05 DIAGNOSIS — M25.569 ARTHRALGIA OF KNEE, UNSPECIFIED LATERALITY: ICD-10-CM

## 2019-07-05 DIAGNOSIS — R20.2 NUMBNESS AND TINGLING OF BOTH LOWER EXTREMITIES: ICD-10-CM

## 2019-07-05 LAB
ALBUMIN UR-MCNC: NEGATIVE MG/DL
APPEARANCE UR: ABNORMAL
BACTERIA #/AREA URNS HPF: ABNORMAL /HPF
BILIRUB UR QL STRIP: NEGATIVE
COLOR UR AUTO: YELLOW
CRP SERPL-MCNC: <2.9 MG/L (ref 0–8)
GLUCOSE UR STRIP-MCNC: NEGATIVE MG/DL
HGB UR QL STRIP: NEGATIVE
KETONES UR STRIP-MCNC: NEGATIVE MG/DL
LEUKOCYTE ESTERASE UR QL STRIP: NEGATIVE
MUCOUS THREADS #/AREA URNS LPF: PRESENT /LPF
NITRATE UR QL: NEGATIVE
PH UR STRIP: 5 PH (ref 5–7)
RBC #/AREA URNS AUTO: 1 /HPF (ref 0–2)
SOURCE: ABNORMAL
SP GR UR STRIP: 1.02 (ref 1–1.03)
SQUAMOUS #/AREA URNS AUTO: 1 /HPF (ref 0–1)
UROBILINOGEN UR STRIP-MCNC: 0 MG/DL (ref 0–2)
WBC #/AREA URNS AUTO: 1 /HPF (ref 0–5)

## 2019-07-05 PROCEDURE — 36415 COLL VENOUS BLD VENIPUNCTURE: CPT | Performed by: INTERNAL MEDICINE

## 2019-07-05 PROCEDURE — G0463 HOSPITAL OUTPT CLINIC VISIT: HCPCS | Mod: ZF

## 2019-07-05 PROCEDURE — 81001 URINALYSIS AUTO W/SCOPE: CPT | Performed by: INTERNAL MEDICINE

## 2019-07-05 PROCEDURE — 86200 CCP ANTIBODY: CPT | Performed by: INTERNAL MEDICINE

## 2019-07-05 PROCEDURE — 86140 C-REACTIVE PROTEIN: CPT | Performed by: INTERNAL MEDICINE

## 2019-07-05 PROCEDURE — 86225 DNA ANTIBODY NATIVE: CPT | Performed by: INTERNAL MEDICINE

## 2019-07-05 ASSESSMENT — MIFFLIN-ST. JEOR: SCORE: 1205.62

## 2019-07-05 ASSESSMENT — PAIN SCALES - GENERAL: PAINLEVEL: MILD PAIN (3)

## 2019-07-05 NOTE — NURSING NOTE
"Chief Complaint   Patient presents with     Consult     Elevated MACI     Blood pressure 108/74, pulse 84, temperature 98.4  F (36.9  C), temperature source Oral, resp. rate 18, height 1.651 m (5' 5\"), weight 56 kg (123 lb 6.4 oz), not currently breastfeeding.    Xochilt Urena CMA on 7/5/2019 at 7:23 AM    "

## 2019-07-05 NOTE — LETTER
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 Blood work form 2019 showed that electrolytes, LFTs, CRP, ferritin, Iron, B12, rheumatoid factor, CBC, and sedimentation rate were all negative or normal. MACI was positive with a titer of 1:80. BARB panel was negative. Lyme titer was negative. 2016 right knee Xray was normal.      My suspicion of clinically actionable autoimmunity in Ms. Schumacher is low. We had a long discussion about the implications of a low positive MACI. We discussed how MACI is not associated with clinical autoimmune syndromes a majority of the time. Especially with low titer MACI, and absent indicators of immunologic dysfunction, the likelihood of new onset autoimmune disease accounting for patient's symptoms is low. Patient describes a strong family history of rheumatoid arthritis, increasing her risk of autoimmunity. Prior rheumatoid factor and CCP antibody were negative, which is reassuring in 's case. I do recommend annual screening by internal medicine or rheumatology for emerging symptoms and signs of autoimmune disease. I will perform anti-dsDNA, repeat CRP, urinalysis and Anti-CCP antibodies today. I will be in contact with the patient with test results, and I will arrange follow-up as necessary.     - DNA double stranded antibodies  - *UA reflex to Microscopic  - CRP inflammation  - Cyclic Citrullinated Peptide Antibody IgG    Follow-up: Return if symptoms worsen or fail to improve.     HPI:   Ms. Trini Schumacher is 43 year old right handed vegetarian female  patient with past medical history of migraine, essential tremor, and multiple benign nevi, who was seen in consultation today requested by Christine Dyer MD, for  musculoskeletal pain and positive MACI.     Patient saw Dr. Etienne on 04/03/2019 in Neurology for leg paraesthesia. Chronic numbness and tingling in both feet was noted. Neurologic exam was non focal. Current symptoms were not deemed consistent with recognized neurologic conditions. No additional testing was recommended.     Patient last saw Dr. Christine Dyer in Family Practice on 07/05/2019 for musculoskeletal pain. The pain had first began in her 20s in her knees and progressively worsened. She is able to reproduce knee pain with pressure under knee caps. The pain in her knees does not radiate. She also experiences inability to bend her left thumb joint, open jars, riding a bike in the first fifteen minutes of starting. Patient thinks her thumb hit a ceiling joist and injured during construction project in June. She has intermittent toe pain, which does not prevent her from walking. She denied swelling, redness, or warmth in any of her joints. The pain varies daily. She has a history of frequent exercise including biking, lifting weights, walking up stairs. She reported occasional heartburn with black tea on an empty stomach.     Today, she reports in February 2019 she had a intermittent buzzing, tapping and electrical sensation in her legs particularly in her left knee. Currently this sensation happens intermittently throughout the day. She is unaware of any triggers for the sensation.     She has crepitance with joints when moving after being sedentary for a period of time. She denies visible swelling in her joints. Her stiffness improves with movement. She has morning stiffness for about an hour. The stiffness affects her feet, ankles, knees and hips. When she exercises for a long period of time, the joints in her feet cause her pain. She has low back  tenderness associated with use.    She has a good appetite with stable weight. She has had less frequent exercise due to time constraints and not due to energy or ability to exercise. She used to do yoga frequently, but now has greater limited range of motion. She had a sedentary job.     She reports worsening vision requiring the use of reading glasses. She has red, dry and irritated eyes and uses artificial tears infrequently. She notices a red appearance on the side of the eyes. Her eyes are crusted when she wakes up. This is not a new symptom. She has not noticed improving dry eyes with lowered use of her computer.     She has had sores in the mouth or nose that last a week. The pain has not lowered her appetite. She does not take pain medication for the sores.  She has phlegm in her throat and has occasional cough associate with the sensation of post nasal drip. She denies trouble with swallowing. She has alternating constipation and looser stools. She denies bloody or dark stools. She has newer abdominal cramping with her periods. She had chicken pox as a child.    She gets a heat rash with redness in the face, arms and legs that alleviates within hours after cooling off. She denies skin rashes. She reports falling more frequently from the stairs and a two step ladder. She has chronic migraines every month and they are controllable with medication.     She denies symptoms of Raynaud's color changes. She has not been treated for sinusitis. She denies chest pain including pleurisy.     Review of Systems:   Pertinent items are noted in HPI or as below, remainder of complete ROS is negative.      No recent problems with hearing or vision. No swallowing problems.   No breathing difficulty, shortness of breath, coughing, or wheezing  No chest pain or palpitations  No heart burn, indigestion, nausea, vomiting,  No urination problems, no bloody, cloudy urine, no dysuria  No numbing, tingling, weakness  No headaches or  "confusion  No easy bleeding or bruising.   +Abdominal Pain  +Diarrhea  +Heat Rashes.     Active Medications:   Current Outpatient Medications:      IRON-B12-VITAMINS PO, , Disp: , Rfl:      ketoconazole (NIZORAL) 2 % shampoo, Apply to the affected area and wash off after 5 minutes., Disp: 120 mL, Rfl: 11     VITAMIN D, CHOLECALCIFEROL, PO, Take 1,000 Units by mouth daily, Disp: , Rfl:       Allergies:   Dairy [milk products]      Past Medical History:  Benign essential tremor, normal B12, iron, vitamin D, TSH. (02/10/2014)  Imbalance  Nystagmus  Fatigue  Migraine  Dysplastic Nevi  Fibrocystic breasts  Vegetarian diet  Herpes Genitales     Past Surgical History:  Laparoscopic donor hand assisted kidney living, unrelated (09/13/2012)    Family History:   Mother- Thyroid disease, Emphysema, Osteoporosis  Father- Hypertension, Nephrolithiasis, Skin Cancer  Sister- rheumatoid arthritis, Hernia, Psoariss   Maternal Grandmother- Osteoporosis, Cerebrovascular disease  No family history of breast cancer, colon cancer, prostate cancer or other cancer.       Social History:   Never smoker. Uses alcohol occasionally.  Works with computers. Recently started a new job.      Physical Exam:   /74   Pulse 84   Temp 98.4  F (36.9  C) (Oral)   Resp 18   Ht 1.651 m (5' 5\")   Wt 56 kg (123 lb 6.4 oz)   BMI 20.53 kg/m      Wt Readings from Last 4 Encounters:   07/05/19 56 kg (123 lb 6.4 oz)   04/03/19 56.7 kg (125 lb)   03/22/19 56.7 kg (125 lb)   12/06/17 55.3 kg (122 lb)     Constitutional: Well-developed, appearing stated age; cooperative  Eyes: Normal EOM, PERRLA, vision, conjunctiva, sclera  ENT: Normal external ears, nose, hearing, lips, teeth, gums, throat. No mucous membrane lesions, normal saliva pool  - excellent dentition, normal gum tissue. No evidence of ulcers.   Neck: No mass or thyroid enlargement  Resp: Lungs clear to auscultation, nl to palpation  CV: RRR, no murmurs, rubs or gallops, no edema  GI: No ABD " mass or tenderness, no HSM  : Not tested  Lymph: No cervical, supraclavicular, inguinal or epitrochlear nodes  MS: The TMJ, neck, shoulder, elbow, wrist, MCP/PIP/DIP, spine, hip, knee, ankle, and foot MTP/IP joints were examined and found normal. No active synovitis or altered joint anatomy. Full joint ROM. Normal  strength. No dactylitis,  tenosynovitis, enthesopathy.  - Limited left lateral range of motion painful range of motion in the neck. Radiation of pain to the left shoulder with left lateral neck motion.   - Fist formation is normal. Wrist and elbow range of motion is normal.   Skin: No nail pitting, alopecia, rash, nodules or lesions  Neuro: Normal cranial nerves, strength, sensation, DTRs.   Psych: Normal judgement, orientation, memory, affect.     Imaging:  XR Right Knee - 3 views (08/18/2016):  Findings:  Joint spaces are preserved. No significant osseous degenerative change. No fracture or malalignment. No joint effusion.  Impression:  No specific finding to explain pain.   Sergey Pham MD    Laboratory:   RHEUM RESULTS Latest Ref Rng & Units 9/25/2014 8/18/2016 3/22/2019   SED RATE 0 - 20 mm/h - 6 7   CRP, INFLAMMATION 0.0 - 8.0 mg/L - <2.9 <2.9   RHEUMATOID FACTOR <20 IU/mL - <20 <20   MACI SCREEN BY EIA <1.0 - <1.0  Interpretation:  Negative   -   AST 0 - 45 U/L - 10 13   ALT 0 - 50 U/L - 22 20   ALBUMIN 3.4 - 5.0 g/dL - 4.4 4.3   WBC 4.0 - 11.0 10e9/L - 9.3 6.3   RBC 3.8 - 5.2 10e12/L - 4.64 -   HGB 11.7 - 15.7 g/dL - 14.2 -   HCT 35.0 - 47.0 % - 41.7 -   MCV 78 - 100 fl - 90 -   MCHC 31.5 - 36.5 g/dL - 34.1 -   RDW 10.0 - 15.0 % - 12.7 -    - 450 10e9/L - 257 -   CREATININE 0.52 - 1.04 mg/dL 1.03 1.07(H) 1.04   GFR ESTIMATE, IF BLACK >60 mL/min/[1.73:m2] 72 68 75   GFR ESTIMATE >60 mL/min/[1.73:m2] 59(L) 56(L) 65   HEP B SURFACE ANAT - - - -   HEPATITIS C ANTIBODY NEG - - -     Rheumatoid Factor   Date Value Ref Range Status   03/22/2019 <20 <20 IU/mL Final     Cyclic Citrullinated  Peptide Antibody, IgG   Date Value Ref Range Status   08/18/2016 1 <7 U/mL Final     Comment:     Negative     Scleroderma Antibody Scl-70 BARB IgG   Date Value Ref Range Status   03/22/2019 <0.2 0.0 - 0.9 AI Final     Comment:     Negative  Antibody index (AI) values reflect qualitative changes in antibody   concentration that cannot be directly associated with clinical condition or   disease state.       SSA (Ro) (BARB) Antibody, IgG   Date Value Ref Range Status   03/22/2019 <0.2 0.0 - 0.9 AI Final     Comment:     Negative  Antibody index (AI) values reflect qualitative changes in antibody   concentration that cannot be directly associated with clinical condition or   disease state.       SSB (La) (BARB) Antibody, IgG   Date Value Ref Range Status   03/22/2019 <0.2 0.0 - 0.9 AI Final     Comment:     Negative  Antibody index (AI) values reflect qualitative changes in antibody   concentration that cannot be directly associated with clinical condition or   disease state.       MACI interpretation   Date Value Ref Range Status   03/22/2019 Borderline Positive (A) NEG^Negative Final     Comment:                                        Reference range:  <1:40  NEGATIVE  1:40 - 1:80  BORDERLINE POSITIVE  >1:80 POSITIVE       MACI pattern 1   Date Value Ref Range Status   03/22/2019 SPECKLED  Final     MACI titer 1   Date Value Ref Range Status   03/22/2019 1:80  Final     MACI Screen by EIA   Date Value Ref Range Status   08/18/2016 <1.0  Interpretation:  Negative   <1.0 Final     Hepatitis B Core Stacey   Date Value Ref Range Status   08/14/2012 Negative NEG Final     Hep B Surface Agn   Date Value Ref Range Status   08/14/2012 Negative NEG Final     Rogers BARB Antibody IgG   Date Value Ref Range Status   03/22/2019 <0.2 0.0 - 0.9 AI Final     Comment:     Negative  Antibody index (AI) values reflect qualitative changes in antibody   concentration that cannot be directly associated with clinical condition or   disease state.        Scleroderma Antibody Scl-70 BARB IgG   Date Value Ref Range Status   03/22/2019 <0.2 0.0 - 0.9 AI Final     Comment:     Negative  Antibody index (AI) values reflect qualitative changes in antibody   concentration that cannot be directly associated with clinical condition or   disease state.       Scribe Disclosure:  I, Lissa Bonilla, am serving as a scribe to document services personally performed by Ed Villarreal MD at this visit, based upon the provider's statements to me. All documentation has been reviewed by the aforementioned provider prior to being entered into the official medical record.    Ed Villarreal MD

## 2019-07-05 NOTE — PATIENT INSTRUCTIONS
1. Positive MACI, borderline.  2. Numbness/tingling  3. Joint cracking/popping/stiffness; hip pain  4. Intermittent oral ulcers    Suspicion for rheumatic disorders such as lupus or rheumatoid arthritis is low, but with strong family history of rheumatoid arthritis, extending screening is reasonable.    Plan:  1. Urinalysis, screening serologies.  2. Resume vigorous regular physical exercise

## 2019-07-05 NOTE — LETTER
2019       RE: Trini Schumacher  [address] [facility] [address]     Dear Colleague,    Thank you for referring your patient, Trini Schumacher, to the [facility] RHEUMATOLOGY at [facility]. Please see a copy of my visit note below.    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 Blood work form 2019 showed that electrolytes, LFTs, CRP, ferritin, Iron, B12, rheumatoid factor, CBC, and sedimentation rate were all negative or normal. MACI was positive with a titer of 1:80. BARB panel was negative. Lyme titer was negative. 2016 right knee Xray was normal.      My suspicion of clinically actionable autoimmunity in Ms. Schumacher is low. We had a long discussion about the implications of a low positive MACI. We discussed how MACI is not associated with clinical autoimmune syndromes a majority of the time. Especially with low titer MACI, and absent indicators of immunologic dysfunction, the likelihood of new onset autoimmune disease accounting for patient's symptoms is low. Patient describes a strong family history of rheumatoid arthritis, increasing her risk of autoimmunity. Prior rheumatoid factor and CCP antibody were negative, which is reassuring in 's case. I do recommend annual screening by internal medicine or rheumatology for emerging symptoms and signs of autoimmune disease. I will perform anti-dsDNA, repeat CRP, urinalysis and Anti-CCP antibodies today. I will be in contact with the patient with test results, and I will arrange follow-up as necessary.     - DNA double stranded antibodies  - *UA reflex to Microscopic  -  CRP inflammation  - Cyclic Citrullinated Peptide Antibody IgG    Follow-up: Return if symptoms worsen or fail to improve.     HPI:   Ms. Trini LOYA [facility] is 43 year old right handed vegetarian female patient with past medical history of migraine, essential tremor, and multiple benign nevi, who was seen in consultation today requested by Christine Dyer MD, for  musculoskeletal pain and positive MACI.     Patient saw Dr. Etienne on 04/03/2019 in Neurology for leg paraesthesia. Chronic numbness and tingling in both feet was noted. Neurologic exam was non focal. Current symptoms were not deemed consistent with recognized neurologic conditions. No additional testing was recommended.     Patient last saw Dr. Christine Dyer in [facility] on 07/05/2019 for musculoskeletal pain. The pain had first began in her 20s in her knees and progressively worsened. She is able to reproduce knee pain with pressure under knee caps. The pain in her knees does not radiate. She also experiences inability to bend her left thumb joint, open jars, riding a bike in the first fifteen minutes of starting. Patient thinks her thumb hit a ceiling joist and injured during construction project in June. She has intermittent toe pain, which does not prevent her from walking. She denied swelling, redness, or warmth in any of her joints. The pain varies daily. She has a history of frequent exercise including biking, lifting weights, walking up stairs. She reported occasional heartburn with black tea on an empty stomach.     Today, she reports in February 2019 she had a intermittent buzzing, tapping and electrical sensation in her legs particularly in her left knee. Currently this sensation happens intermittently throughout the day. She is unaware of any triggers for the sensation.     She has crepitance with joints when moving after being sedentary for a period of time. She denies visible swelling in her joints. Her stiffness improves with movement.  She has morning stiffness for about an hour. The stiffness affects her feet, ankles, knees and hips. When she exercises for a long period of time, the joints in her feet cause her pain. She has low back tenderness associated with use.    She has a good appetite with stable weight. She has had less frequent exercise due to time constraints and not due to energy or ability to exercise. She used to do yoga frequently, but now has greater limited range of motion. She had a sedentary job.     She reports worsening vision requiring the use of reading glasses. She has red, dry and irritated eyes and uses artificial tears infrequently. She notices a red appearance on the side of the eyes. Her eyes are crusted when she wakes up. This is not a new symptom. She has not noticed improving dry eyes with lowered use of her computer.     She has had sores in the mouth or nose that last a week. The pain has not lowered her appetite. She does not take pain medication for the sores.  She has phlegm in her throat and has occasional cough associate with the sensation of post nasal drip. She denies trouble with swallowing. She has alternating constipation and looser stools. She denies bloody or dark stools. She has newer abdominal cramping with her periods. She had chicken pox as a child.    She gets a heat rash with redness in the face, arms and legs that alleviates within hours after cooling off. She denies skin rashes. She reports falling more frequently from the stairs and a two step ladder. She has chronic migraines every month and they are controllable with medication.     She denies symptoms of Raynaud's color changes. She has not been treated for sinusitis. She denies chest pain including pleurisy.     Review of Systems:   Pertinent items are noted in HPI or as below, remainder of complete ROS is negative.      No recent problems with hearing or vision. No swallowing problems.   No breathing difficulty, shortness of breath,  "coughing, or wheezing  No chest pain or palpitations  No heart burn, indigestion, nausea, vomiting,  No urination problems, no bloody, cloudy urine, no dysuria  No numbing, tingling, weakness  No headaches or confusion  No easy bleeding or bruising.   +Abdominal Pain  +Diarrhea  +Heat Rashes.     Active Medications:   Current Outpatient Medications:      IRON-B12-VITAMINS PO, , Disp: , Rfl:      ketoconazole (NIZORAL) 2 % shampoo, Apply to the affected area and wash off after 5 minutes., Disp: 120 mL, Rfl: 11     VITAMIN D, CHOLECALCIFEROL, PO, Take 1,000 Units by mouth daily, Disp: , Rfl:       Allergies:   Dairy [milk products]      Past Medical History:  Benign essential tremor, normal B12, iron, vitamin D, TSH. (02/10/2014)  Imbalance  Nystagmus  Fatigue  Migraine  Dysplastic Nevi  Fibrocystic breasts  Vegetarian diet  Herpes Genitales     Past Surgical History:  Laparoscopic donor hand assisted kidney living, unrelated (09/13/2012)    Family History:   Mother- Thyroid disease, Emphysema, Osteoporosis  Father- Hypertension, Nephrolithiasis, Skin Cancer  Sister- rheumatoid arthritis, Hernia, Psoariss   Maternal Grandmother- Osteoporosis, Cerebrovascular disease  No family history of breast cancer, colon cancer, prostate cancer or other cancer.       Social History:   Never smoker. Uses alcohol occasionally.  Works with computers. Recently started a new job.      Physical Exam:   /74   Pulse 84   Temp 98.4  F (36.9  C) (Oral)   Resp 18   Ht 1.651 m (5' 5\")   Wt 56 kg (123 lb 6.4 oz)   BMI 20.53 kg/m      Wt Readings from Last 4 Encounters:   07/05/19 56 kg (123 lb 6.4 oz)   04/03/19 56.7 kg (125 lb)   03/22/19 56.7 kg (125 lb)   12/06/17 55.3 kg (122 lb)     Constitutional: Well-developed, appearing stated age; cooperative  Eyes: Normal EOM, PERRLA, vision, conjunctiva, sclera  ENT: Normal external ears, nose, hearing, lips, teeth, gums, throat. No mucous membrane lesions, normal saliva pool  - " excellent dentition, normal gum tissue. No evidence of ulcers.   Neck: No mass or thyroid enlargement  Resp: Lungs clear to auscultation, nl to palpation  CV: RRR, no murmurs, rubs or gallops, no edema  GI: No ABD mass or tenderness, no HSM  : Not tested  Lymph: No cervical, supraclavicular, inguinal or epitrochlear nodes  MS: The TMJ, neck, shoulder, elbow, wrist, MCP/PIP/DIP, spine, hip, knee, ankle, and foot MTP/IP joints were examined and found normal. No active synovitis or altered joint anatomy. Full joint ROM. Normal  strength. No dactylitis,  tenosynovitis, enthesopathy.  - Limited left lateral range of motion painful range of motion in the neck. Radiation of pain to the left shoulder with left lateral neck motion.   - Fist formation is normal. Wrist and elbow range of motion is normal.   Skin: No nail pitting, alopecia, rash, nodules or lesions  Neuro: Normal cranial nerves, strength, sensation, DTRs.   Psych: Normal judgement, orientation, memory, affect.     Imaging:  XR Right Knee - 3 views (08/18/2016):  Findings:  Joint spaces are preserved. No significant osseous degenerative change. No fracture or malalignment. No joint effusion.  Impression:  No specific finding to explain pain.   Sergey Pham MD    Laboratory:   RHEUM RESULTS Latest Ref Rng & Units 9/25/2014 8/18/2016 3/22/2019   SED RATE 0 - 20 mm/h - 6 7   CRP, INFLAMMATION 0.0 - 8.0 mg/L - <2.9 <2.9   RHEUMATOID FACTOR <20 IU/mL - <20 <20   MACI SCREEN BY EIA <1.0 - <1.0  Interpretation:  Negative   -   AST 0 - 45 U/L - 10 13   ALT 0 - 50 U/L - 22 20   ALBUMIN 3.4 - 5.0 g/dL - 4.4 4.3   WBC 4.0 - 11.0 10e9/L - 9.3 6.3   RBC 3.8 - 5.2 10e12/L - 4.64 -   HGB 11.7 - 15.7 g/dL - 14.2 -   HCT 35.0 - 47.0 % - 41.7 -   MCV 78 - 100 fl - 90 -   MCHC 31.5 - 36.5 g/dL - 34.1 -   RDW 10.0 - 15.0 % - 12.7 -    - 450 10e9/L - 257 -   CREATININE 0.52 - 1.04 mg/dL 1.03 1.07(H) 1.04   GFR ESTIMATE, IF BLACK >60 mL/min/[1.73:m2] 72 68 75   GFR  ESTIMATE >60 mL/min/[1.73:m2] 59(L) 56(L) 65   HEP B SURFACE ANAT - - - -   HEPATITIS C ANTIBODY NEG - - -     Rheumatoid Factor   Date Value Ref Range Status   03/22/2019 <20 <20 IU/mL Final     Cyclic Citrullinated Peptide Antibody, IgG   Date Value Ref Range Status   08/18/2016 1 <7 U/mL Final     Comment:     Negative     Scleroderma Antibody Scl-70 BARB IgG   Date Value Ref Range Status   03/22/2019 <0.2 0.0 - 0.9 AI Final     Comment:     Negative  Antibody index (AI) values reflect qualitative changes in antibody   concentration that cannot be directly associated with clinical condition or   disease state.       SSA (Ro) (BARB) Antibody, IgG   Date Value Ref Range Status   03/22/2019 <0.2 0.0 - 0.9 AI Final     Comment:     Negative  Antibody index (AI) values reflect qualitative changes in antibody   concentration that cannot be directly associated with clinical condition or   disease state.       SSB (La) (BARB) Antibody, IgG   Date Value Ref Range Status   03/22/2019 <0.2 0.0 - 0.9 AI Final     Comment:     Negative  Antibody index (AI) values reflect qualitative changes in antibody   concentration that cannot be directly associated with clinical condition or   disease state.       MACI interpretation   Date Value Ref Range Status   03/22/2019 Borderline Positive (A) NEG^Negative Final     Comment:                                        Reference range:  <1:40  NEGATIVE  1:40 - 1:80  BORDERLINE POSITIVE  >1:80 POSITIVE       MACI pattern 1   Date Value Ref Range Status   03/22/2019 SPECKLED  Final     MACI titer 1   Date Value Ref Range Status   03/22/2019 1:80  Final     MACI Screen by EIA   Date Value Ref Range Status   08/18/2016 <1.0  Interpretation:  Negative   <1.0 Final     Hepatitis B Core Anat   Date Value Ref Range Status   08/14/2012 Negative NEG Final     Hep B Surface Agn   Date Value Ref Range Status   08/14/2012 Negative NEG Final     [place] BARB Antibody IgG   Date Value Ref Range Status   03/22/2019  <0.2 0.0 - 0.9 AI Final     Comment:     Negative  Antibody index (AI) values reflect qualitative changes in antibody   concentration that cannot be directly associated with clinical condition or   disease state.       Scleroderma Antibody Scl-70 BARB IgG   Date Value Ref Range Status   03/22/2019 <0.2 0.0 - 0.9 AI Final     Comment:     Negative  Antibody index (AI) values reflect qualitative changes in antibody   concentration that cannot be directly associated with clinical condition or   disease state.       Scribe Disclosure:  I, Lissa Bonilla, am serving as a scribe to document services personally performed by Ed Villarreal MD at this visit, based upon the provider's statements to me. All documentation has been reviewed by the aforementioned provider prior to being entered into the official medical record.    Again, thank you for allowing me to participate in the care of your patient.      Sincerely,    Ed Villarreal MD

## 2019-07-05 NOTE — PROGRESS NOTES
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 Blood work form 2019 showed that electrolytes, LFTs, CRP, ferritin, Iron, B12, rheumatoid factor, CBC, and sedimentation rate were all negative or normal. MACI was positive with a titer of 1:80. BARB panel was negative. Lyme titer was negative. 2016 right knee Xray was normal.      My suspicion of clinically actionable autoimmunity in Ms. Schumacher is low. We had a long discussion about the implications of a low positive MACI. We discussed how MACI is not associated with clinical autoimmune syndromes a majority of the time. Especially with low titer MACI, and absent indicators of immunologic dysfunction, the likelihood of new onset autoimmune disease accounting for patient's symptoms is low. Patient describes a strong family history of rheumatoid arthritis, increasing her risk of autoimmunity. Prior rheumatoid factor and CCP antibody were negative, which is reassuring in 's case. I do recommend annual screening by internal medicine or rheumatology for emerging symptoms and signs of autoimmune disease. I will perform anti-dsDNA, repeat CRP, urinalysis and Anti-CCP antibodies today. I will be in contact with the patient with test results, and I will arrange follow-up as necessary.     - DNA double stranded antibodies  - *UA reflex to Microscopic  - CRP inflammation  - Cyclic Citrullinated Peptide Antibody IgG    Follow-up: Return if symptoms worsen or fail to improve.     HPI:   Ms. Trini Schumacher is 43 year old right handed vegetarian female patient with past medical history of migraine, essential tremor, and multiple benign nevi,  who was seen in consultation today requested by Christine Dyer MD, for musculoskeletal pain and positive MACI.     Patient saw Dr. Etienne on 04/03/2019 in Neurology for leg paraesthesia. Chronic numbness and tingling in both feet was noted. Neurologic exam was non focal. Current symptoms were not deemed consistent with recognized neurologic conditions. No additional testing was recommended.     Patient last saw Dr. Christine Dyer in [facility] on 07/05/2019 for musculoskeletal pain. The pain had first began in her 20s in her knees and progressively worsened. She is able to reproduce knee pain with pressure under knee caps. The pain in her knees does not radiate. She also experiences inability to bend her left thumb joint, open jars, riding a bike in the first fifteen minutes of starting. Patient thinks her thumb hit a ceiling joist and injured during construction project in June. She has intermittent toe pain, which does not prevent her from walking. She denied swelling, redness, or warmth in any of her joints. The pain varies daily. She has a history of frequent exercise including biking, lifting weights, walking up stairs. She reported occasional heartburn with black tea on an empty stomach.     Today, she reports in February 2019 she had a intermittent buzzing, tapping and electrical sensation in her legs particularly in her left knee. Currently this sensation happens intermittently throughout the day. She is unaware of any triggers for the sensation.     She has crepitance with joints when moving after being sedentary for a period of time. She denies visible swelling in her joints. Her stiffness improves with movement. She has morning stiffness for about an hour. The stiffness affects her feet, ankles, knees and hips. When she exercises for a long period of time, the joints in her feet cause her pain. She has low back tenderness associated with use.    She has a good appetite with stable weight. She has had  less frequent exercise due to time constraints and not due to energy or ability to exercise. She used to do yoga frequently, but now has greater limited range of motion. She had a sedentary job.     She reports worsening vision requiring the use of reading glasses. She has red, dry and irritated eyes and uses artificial tears infrequently. She notices a red appearance on the side of the eyes. Her eyes are crusted when she wakes up. This is not a new symptom. She has not noticed improving dry eyes with lowered use of her computer.     She has had sores in the mouth or nose that last a week. The pain has not lowered her appetite. She does not take pain medication for the sores.  She has phlegm in her throat and has occasional cough associate with the sensation of post nasal drip. She denies trouble with swallowing. She has alternating constipation and looser stools. She denies bloody or dark stools. She has newer abdominal cramping with her periods. She had chicken pox as a child.    She gets a heat rash with redness in the face, arms and legs that alleviates within hours after cooling off. She denies skin rashes. She reports falling more frequently from the stairs and a two step ladder. She has chronic migraines every month and they are controllable with medication.     She denies symptoms of Raynaud's color changes. She has not been treated for sinusitis. She denies chest pain including pleurisy.     Review of Systems:   Pertinent items are noted in HPI or as below, remainder of complete ROS is negative.      No recent problems with hearing or vision. No swallowing problems.   No breathing difficulty, shortness of breath, coughing, or wheezing  No chest pain or palpitations  No heart burn, indigestion, nausea, vomiting,  No urination problems, no bloody, cloudy urine, no dysuria  No numbing, tingling, weakness  No headaches or confusion  No easy bleeding or bruising.   +Abdominal Pain  +Diarrhea  +Heat Rashes.    "  Active Medications:   Current Outpatient Medications:      IRON-B12-VITAMINS PO, , Disp: , Rfl:      ketoconazole (NIZORAL) 2 % shampoo, Apply to the affected area and wash off after 5 minutes., Disp: 120 mL, Rfl: 11     VITAMIN D, CHOLECALCIFEROL, PO, Take 1,000 Units by mouth daily, Disp: , Rfl:       Allergies:   Dairy [milk products]      Past Medical History:  Benign essential tremor, normal B12, iron, vitamin D, TSH. (02/10/2014)  Imbalance  Nystagmus  Fatigue  Migraine  Dysplastic Nevi  Fibrocystic breasts  Vegetarian diet  Herpes Genitales     Past Surgical History:  Laparoscopic donor hand assisted kidney living, unrelated (09/13/2012)    Family History:   Mother- Thyroid disease, Emphysema, Osteoporosis  Father- Hypertension, Nephrolithiasis, Skin Cancer  Sister- rheumatoid arthritis, Hernia, Psoariss   Maternal Grandmother- Osteoporosis, Cerebrovascular disease  No family history of breast cancer, colon cancer, prostate cancer or other cancer.       Social History:   Never smoker. Uses alcohol occasionally.  Works with computers. Recently started a new job.      Physical Exam:   /74   Pulse 84   Temp 98.4  F (36.9  C) (Oral)   Resp 18   Ht 1.651 m (5' 5\")   Wt 56 kg (123 lb 6.4 oz)   BMI 20.53 kg/m     Wt Readings from Last 4 Encounters:   07/05/19 56 kg (123 lb 6.4 oz)   04/03/19 56.7 kg (125 lb)   03/22/19 56.7 kg (125 lb)   12/06/17 55.3 kg (122 lb)     Constitutional: Well-developed, appearing stated age; cooperative  Eyes: Normal EOM, PERRLA, vision, conjunctiva, sclera  ENT: Normal external ears, nose, hearing, lips, teeth, gums, throat. No mucous membrane lesions, normal saliva pool  - excellent dentition, normal gum tissue. No evidence of ulcers.   Neck: No mass or thyroid enlargement  Resp: Lungs clear to auscultation, nl to palpation  CV: RRR, no murmurs, rubs or gallops, no edema  GI: No ABD mass or tenderness, no HSM  : Not tested  Lymph: No cervical, supraclavicular, inguinal " or epitrochlear nodes  MS: The TMJ, neck, shoulder, elbow, wrist, MCP/PIP/DIP, spine, hip, knee, ankle, and foot MTP/IP joints were examined and found normal. No active synovitis or altered joint anatomy. Full joint ROM. Normal  strength. No dactylitis,  tenosynovitis, enthesopathy.  - Limited left lateral range of motion painful range of motion in the neck. Radiation of pain to the left shoulder with left lateral neck motion.   - Fist formation is normal. Wrist and elbow range of motion is normal.   Skin: No nail pitting, alopecia, rash, nodules or lesions  Neuro: Normal cranial nerves, strength, sensation, DTRs.   Psych: Normal judgement, orientation, memory, affect.     Imaging:  XR Right Knee - 3 views (08/18/2016):  Findings:  Joint spaces are preserved. No significant osseous degenerative change. No fracture or malalignment. No joint effusion.  Impression:  No specific finding to explain pain.   Sergey Pham MD    Laboratory:   RHEUM RESULTS Latest Ref Rng & Units 9/25/2014 8/18/2016 3/22/2019   SED RATE 0 - 20 mm/h - 6 7   CRP, INFLAMMATION 0.0 - 8.0 mg/L - <2.9 <2.9   RHEUMATOID FACTOR <20 IU/mL - <20 <20   MACI SCREEN BY EIA <1.0 - <1.0  Interpretation:  Negative   -   AST 0 - 45 U/L - 10 13   ALT 0 - 50 U/L - 22 20   ALBUMIN 3.4 - 5.0 g/dL - 4.4 4.3   WBC 4.0 - 11.0 10e9/L - 9.3 6.3   RBC 3.8 - 5.2 10e12/L - 4.64 -   HGB 11.7 - 15.7 g/dL - 14.2 -   HCT 35.0 - 47.0 % - 41.7 -   MCV 78 - 100 fl - 90 -   MCHC 31.5 - 36.5 g/dL - 34.1 -   RDW 10.0 - 15.0 % - 12.7 -    - 450 10e9/L - 257 -   CREATININE 0.52 - 1.04 mg/dL 1.03 1.07(H) 1.04   GFR ESTIMATE, IF BLACK >60 mL/min/[1.73:m2] 72 68 75   GFR ESTIMATE >60 mL/min/[1.73:m2] 59(L) 56(L) 65   HEP B SURFACE ANAT - - - -   HEPATITIS C ANTIBODY NEG - - -     Rheumatoid Factor   Date Value Ref Range Status   03/22/2019 <20 <20 IU/mL Final     Cyclic Citrullinated Peptide Antibody, IgG   Date Value Ref Range Status   08/18/2016 1 <7 U/mL Final     Comment:      Negative     Scleroderma Antibody Scl-70 BARB IgG   Date Value Ref Range Status   03/22/2019 <0.2 0.0 - 0.9 AI Final     Comment:     Negative  Antibody index (AI) values reflect qualitative changes in antibody   concentration that cannot be directly associated with clinical condition or   disease state.       SSA (Ro) (BARB) Antibody, IgG   Date Value Ref Range Status   03/22/2019 <0.2 0.0 - 0.9 AI Final     Comment:     Negative  Antibody index (AI) values reflect qualitative changes in antibody   concentration that cannot be directly associated with clinical condition or   disease state.       SSB (La) (BARB) Antibody, IgG   Date Value Ref Range Status   03/22/2019 <0.2 0.0 - 0.9 AI Final     Comment:     Negative  Antibody index (AI) values reflect qualitative changes in antibody   concentration that cannot be directly associated with clinical condition or   disease state.       MACI interpretation   Date Value Ref Range Status   03/22/2019 Borderline Positive (A) NEG^Negative Final     Comment:                                        Reference range:  <1:40  NEGATIVE  1:40 - 1:80  BORDERLINE POSITIVE  >1:80 POSITIVE       MACI pattern 1   Date Value Ref Range Status   03/22/2019 SPECKLED  Final     MACI titer 1   Date Value Ref Range Status   03/22/2019 1:80  Final     MACI Screen by EIA   Date Value Ref Range Status   08/18/2016 <1.0  Interpretation:  Negative   <1.0 Final     Hepatitis B Core Stacey   Date Value Ref Range Status   08/14/2012 Negative NEG Final     Hep B Surface Agn   Date Value Ref Range Status   08/14/2012 Negative NEG Final     [place]ers BARB Antibody IgG   Date Value Ref Range Status   03/22/2019 <0.2 0.0 - 0.9 AI Final     Comment:     Negative  Antibody index (AI) values reflect qualitative changes in antibody   concentration that cannot be directly associated with clinical condition or   disease state.       Scleroderma Antibody Scl-70 BARB IgG   Date Value Ref Range Status   03/22/2019 <0.2 0.0 -  0.9 AI Final     Comment:     Negative  Antibody index (AI) values reflect qualitative changes in antibody   concentration that cannot be directly associated with clinical condition or   disease state.       Scribe Disclosure:  I, Lissa Bonilla, am serving as a scribe to document services personally performed by Ed Villarreal MD at this visit, based upon the provider's statements to me. All documentation has been reviewed by the aforementioned provider prior to being entered into the official medical record.

## 2019-07-08 LAB
CCP AB SER IA-ACNC: 1 U/ML
DSDNA AB SER-ACNC: <1 IU/ML

## 2019-10-05 ENCOUNTER — HEALTH MAINTENANCE LETTER (OUTPATIENT)
Age: 44
End: 2019-10-05

## 2020-07-15 ENCOUNTER — MYC MEDICAL ADVICE (OUTPATIENT)
Dept: FAMILY MEDICINE | Facility: CLINIC | Age: 45
End: 2020-07-15

## 2020-11-14 ENCOUNTER — HEALTH MAINTENANCE LETTER (OUTPATIENT)
Age: 45
End: 2020-11-14

## 2021-02-06 ENCOUNTER — HEALTH MAINTENANCE LETTER (OUTPATIENT)
Age: 46
End: 2021-02-06

## 2021-09-12 ENCOUNTER — HEALTH MAINTENANCE LETTER (OUTPATIENT)
Age: 46
End: 2021-09-12

## 2022-01-02 ENCOUNTER — HEALTH MAINTENANCE LETTER (OUTPATIENT)
Age: 47
End: 2022-01-02

## 2022-02-27 ENCOUNTER — HEALTH MAINTENANCE LETTER (OUTPATIENT)
Age: 47
End: 2022-02-27

## 2022-11-19 ENCOUNTER — HEALTH MAINTENANCE LETTER (OUTPATIENT)
Age: 47
End: 2022-11-19

## 2023-04-09 ENCOUNTER — HEALTH MAINTENANCE LETTER (OUTPATIENT)
Age: 48
End: 2023-04-09

## 2024-06-15 ENCOUNTER — HEALTH MAINTENANCE LETTER (OUTPATIENT)
Age: 49
End: 2024-06-15